# Patient Record
Sex: FEMALE | Race: WHITE
[De-identification: names, ages, dates, MRNs, and addresses within clinical notes are randomized per-mention and may not be internally consistent; named-entity substitution may affect disease eponyms.]

---

## 2019-03-07 ENCOUNTER — HOSPITAL ENCOUNTER (EMERGENCY)
Dept: HOSPITAL 55 - ED | Age: 34
Discharge: HOME | DRG: 690 | End: 2019-03-07
Payer: COMMERCIAL

## 2019-03-07 VITALS
SYSTOLIC BLOOD PRESSURE: 115 MMHG | RESPIRATION RATE: 18 BRPM | TEMPERATURE: 97.9 F | OXYGEN SATURATION: 100 % | DIASTOLIC BLOOD PRESSURE: 75 MMHG | HEART RATE: 98 BPM

## 2019-03-07 DIAGNOSIS — N39.0: Primary | ICD-10-CM

## 2019-03-07 LAB
BACTERIA #/AREA URNS HPF: (no result) /[HPF]
BILIRUB UR QL STRIP: NEGATIVE
CRYSTALS #/AREA FLD MICRO: NEGATIVE /[LPF]
EPI CELLS #/AREA URNS HPF: (no result) /[HPF]
GLUCOSE, URINE (UA): NEGATIVE
KETONES UR QL STRIP: NEGATIVE
LEUKOCYTE ESTERASE UR QL STRIP: (no result)
LEUKOCYTE ESTERASE UR QL STRIP: (no result)
NITRATE,URINE: NEGATIVE
PH UR STRIP: 7 [PH]
RBC # UR STRIP: (no result) /UL
RBC UR QL: (no result)
SP GR UR STRIP: 1.02
UROBILINOGEN,URINE: 0.2

## 2019-03-07 PROCEDURE — 87186 SC STD MICRODIL/AGAR DIL: CPT

## 2019-03-07 PROCEDURE — 81001 URINALYSIS AUTO W/SCOPE: CPT

## 2019-03-07 PROCEDURE — 99282 EMERGENCY DEPT VISIT SF MDM: CPT

## 2019-03-07 PROCEDURE — 87077 CULTURE AEROBIC IDENTIFY: CPT

## 2019-03-07 PROCEDURE — 87088 URINE BACTERIA CULTURE: CPT

## 2020-03-04 ENCOUNTER — ANESTHESIA EVENT (OUTPATIENT)
Dept: SURGERY | Facility: CLINIC | Age: 35
End: 2020-03-04
Payer: MEDICAID

## 2020-03-04 ENCOUNTER — ANESTHESIA (OUTPATIENT)
Dept: SURGERY | Facility: CLINIC | Age: 35
End: 2020-03-04
Payer: MEDICAID

## 2020-03-04 ENCOUNTER — MEDICAL CORRESPONDENCE (OUTPATIENT)
Dept: HEALTH INFORMATION MANAGEMENT | Facility: CLINIC | Age: 35
End: 2020-03-04

## 2020-03-04 ENCOUNTER — HOSPITAL ENCOUNTER (INPATIENT)
Facility: CLINIC | Age: 35
LOS: 2 days | Discharge: HOME | End: 2020-03-06
Attending: FAMILY MEDICINE | Admitting: OBSTETRICS & GYNECOLOGY
Payer: MEDICAID

## 2020-03-04 DIAGNOSIS — R10.84 ABDOMINAL PAIN, GENERALIZED: ICD-10-CM

## 2020-03-04 DIAGNOSIS — Z98.890 S/P EXPLORATORY LAPAROTOMY: Primary | ICD-10-CM

## 2020-03-04 DIAGNOSIS — S37.69XA UTERINE PERFORATION, INITIAL ENCOUNTER: ICD-10-CM

## 2020-03-04 LAB
ABO + RH BLD: NORMAL
ABO + RH BLD: NORMAL
ANION GAP SERPL CALCULATED.3IONS-SCNC: 8 MMOL/L (ref 3–14)
BASOPHILS # BLD AUTO: 0 10E9/L (ref 0–0.2)
BASOPHILS NFR BLD AUTO: 0.4 %
BLD GP AB SCN SERPL QL: NORMAL
BLD PROD TYP BPU: NORMAL
BLOOD BANK CMNT PATIENT-IMP: NORMAL
BUN SERPL-MCNC: 7 MG/DL (ref 7–30)
CALCIUM SERPL-MCNC: 8 MG/DL (ref 8.5–10.1)
CHLORIDE SERPL-SCNC: 110 MMOL/L (ref 94–109)
CO2 SERPL-SCNC: 21 MMOL/L (ref 20–32)
CREAT BLD-MCNC: 0.4 MG/DL (ref 0.52–1.04)
CREAT SERPL-MCNC: 0.44 MG/DL (ref 0.52–1.04)
DIFFERENTIAL METHOD BLD: ABNORMAL
EOSINOPHIL # BLD AUTO: 0.1 10E9/L (ref 0–0.7)
EOSINOPHIL NFR BLD AUTO: 1.3 %
ERYTHROCYTE [DISTWIDTH] IN BLOOD BY AUTOMATED COUNT: 13.3 % (ref 10–15)
GFR SERPL CREATININE-BSD FRML MDRD: >90 ML/MIN/{1.73_M2}
GFR SERPL CREATININE-BSD FRML MDRD: >90 ML/MIN/{1.73_M2}
GLUCOSE SERPL-MCNC: 83 MG/DL (ref 70–99)
HCT VFR BLD AUTO: 34.5 % (ref 35–47)
HGB BLD-MCNC: 11.9 G/DL (ref 11.7–15.7)
IMM GRANULOCYTES # BLD: 0 10E9/L (ref 0–0.4)
IMM GRANULOCYTES NFR BLD: 0.3 %
LYMPHOCYTES # BLD AUTO: 2.6 10E9/L (ref 0.8–5.3)
LYMPHOCYTES NFR BLD AUTO: 33.5 %
MCH RBC QN AUTO: 30.7 PG (ref 26.5–33)
MCHC RBC AUTO-ENTMCNC: 34.5 G/DL (ref 31.5–36.5)
MCV RBC AUTO: 89 FL (ref 78–100)
MONOCYTES # BLD AUTO: 0.4 10E9/L (ref 0–1.3)
MONOCYTES NFR BLD AUTO: 4.7 %
NEUTROPHILS # BLD AUTO: 4.7 10E9/L (ref 1.6–8.3)
NEUTROPHILS NFR BLD AUTO: 59.8 %
NRBC # BLD AUTO: 0 10*3/UL
NRBC BLD AUTO-RTO: 0 /100
NUM BPU REQUESTED: 1
PLATELET # BLD AUTO: 174 10E9/L (ref 150–450)
POTASSIUM SERPL-SCNC: 3.4 MMOL/L (ref 3.4–5.3)
RBC # BLD AUTO: 3.87 10E12/L (ref 3.8–5.2)
SODIUM SERPL-SCNC: 139 MMOL/L (ref 133–144)
SPECIMEN EXP DATE BLD: NORMAL
WBC # BLD AUTO: 7.8 10E9/L (ref 4–11)

## 2020-03-04 PROCEDURE — 88305 TISSUE EXAM BY PATHOLOGIST: CPT | Mod: 26 | Performed by: OBSTETRICS & GYNECOLOGY

## 2020-03-04 PROCEDURE — 85025 COMPLETE CBC W/AUTO DIFF WBC: CPT | Performed by: FAMILY MEDICINE

## 2020-03-04 PROCEDURE — 96361 HYDRATE IV INFUSION ADD-ON: CPT | Performed by: FAMILY MEDICINE

## 2020-03-04 PROCEDURE — 25000128 H RX IP 250 OP 636: Performed by: ANESTHESIOLOGY

## 2020-03-04 PROCEDURE — 37000009 ZZH ANESTHESIA TECHNICAL FEE, EACH ADDTL 15 MIN: Performed by: OBSTETRICS & GYNECOLOGY

## 2020-03-04 PROCEDURE — 86850 RBC ANTIBODY SCREEN: CPT | Performed by: FAMILY MEDICINE

## 2020-03-04 PROCEDURE — 25800030 ZZH RX IP 258 OP 636: Performed by: NURSE ANESTHETIST, CERTIFIED REGISTERED

## 2020-03-04 PROCEDURE — 25000125 ZZHC RX 250: Performed by: STUDENT IN AN ORGANIZED HEALTH CARE EDUCATION/TRAINING PROGRAM

## 2020-03-04 PROCEDURE — 40000170 ZZH STATISTIC PRE-PROCEDURE ASSESSMENT II: Performed by: OBSTETRICS & GYNECOLOGY

## 2020-03-04 PROCEDURE — 36000059 ZZH SURGERY LEVEL 3 EA 15 ADDTL MIN UMMC: Performed by: OBSTETRICS & GYNECOLOGY

## 2020-03-04 PROCEDURE — 12000001 ZZH R&B MED SURG/OB UMMC

## 2020-03-04 PROCEDURE — 71000014 ZZH RECOVERY PHASE 1 LEVEL 2 FIRST HR: Performed by: OBSTETRICS & GYNECOLOGY

## 2020-03-04 PROCEDURE — 25000125 ZZHC RX 250: Performed by: NURSE ANESTHETIST, CERTIFIED REGISTERED

## 2020-03-04 PROCEDURE — 88305 TISSUE EXAM BY PATHOLOGIST: CPT | Performed by: OBSTETRICS & GYNECOLOGY

## 2020-03-04 PROCEDURE — 25800030 ZZH RX IP 258 OP 636: Performed by: STUDENT IN AN ORGANIZED HEALTH CARE EDUCATION/TRAINING PROGRAM

## 2020-03-04 PROCEDURE — 10A07ZZ ABORTION OF PRODUCTS OF CONCEPTION, VIA NATURAL OR ARTIFICIAL OPENING: ICD-10-PCS | Performed by: OBSTETRICS & GYNECOLOGY

## 2020-03-04 PROCEDURE — 25000566 ZZH SEVOFLURANE, EA 15 MIN: Performed by: OBSTETRICS & GYNECOLOGY

## 2020-03-04 PROCEDURE — 25000128 H RX IP 250 OP 636: Performed by: NURSE ANESTHETIST, CERTIFIED REGISTERED

## 2020-03-04 PROCEDURE — 96374 THER/PROPH/DIAG INJ IV PUSH: CPT | Performed by: FAMILY MEDICINE

## 2020-03-04 PROCEDURE — 00000159 ZZHCL STATISTIC H-SEND OUTS PREP: Performed by: OBSTETRICS & GYNECOLOGY

## 2020-03-04 PROCEDURE — C9290 INJ, BUPIVACAINE LIPOSOME: HCPCS | Performed by: ANESTHESIOLOGY

## 2020-03-04 PROCEDURE — 37000008 ZZH ANESTHESIA TECHNICAL FEE, 1ST 30 MIN: Performed by: OBSTETRICS & GYNECOLOGY

## 2020-03-04 PROCEDURE — 36000057 ZZH SURGERY LEVEL 3 1ST 30 MIN - UMMC: Performed by: OBSTETRICS & GYNECOLOGY

## 2020-03-04 PROCEDURE — 80048 BASIC METABOLIC PNL TOTAL CA: CPT | Performed by: FAMILY MEDICINE

## 2020-03-04 PROCEDURE — 86901 BLOOD TYPING SEROLOGIC RH(D): CPT | Performed by: FAMILY MEDICINE

## 2020-03-04 PROCEDURE — 82565 ASSAY OF CREATININE: CPT

## 2020-03-04 PROCEDURE — 25800030 ZZH RX IP 258 OP 636: Performed by: FAMILY MEDICINE

## 2020-03-04 PROCEDURE — 99285 EMERGENCY DEPT VISIT HI MDM: CPT | Mod: 25 | Performed by: FAMILY MEDICINE

## 2020-03-04 PROCEDURE — 86923 COMPATIBILITY TEST ELECTRIC: CPT | Performed by: FAMILY MEDICINE

## 2020-03-04 PROCEDURE — 27210794 ZZH OR GENERAL SUPPLY STERILE: Performed by: OBSTETRICS & GYNECOLOGY

## 2020-03-04 PROCEDURE — 25000128 H RX IP 250 OP 636: Performed by: FAMILY MEDICINE

## 2020-03-04 PROCEDURE — 0UQ90ZZ REPAIR UTERUS, OPEN APPROACH: ICD-10-PCS | Performed by: OBSTETRICS & GYNECOLOGY

## 2020-03-04 PROCEDURE — 71000015 ZZH RECOVERY PHASE 1 LEVEL 2 EA ADDTL HR: Performed by: OBSTETRICS & GYNECOLOGY

## 2020-03-04 PROCEDURE — 99284 EMERGENCY DEPT VISIT MOD MDM: CPT | Mod: Z6 | Performed by: FAMILY MEDICINE

## 2020-03-04 PROCEDURE — 25000128 H RX IP 250 OP 636: Performed by: STUDENT IN AN ORGANIZED HEALTH CARE EDUCATION/TRAINING PROGRAM

## 2020-03-04 PROCEDURE — 25000132 ZZH RX MED GY IP 250 OP 250 PS 637: Performed by: OBSTETRICS & GYNECOLOGY

## 2020-03-04 PROCEDURE — 86900 BLOOD TYPING SEROLOGIC ABO: CPT | Performed by: FAMILY MEDICINE

## 2020-03-04 RX ORDER — KETOROLAC TROMETHAMINE 15 MG/ML
15 INJECTION, SOLUTION INTRAMUSCULAR; INTRAVENOUS EVERY 6 HOURS
Status: DISCONTINUED | OUTPATIENT
Start: 2020-03-04 | End: 2020-03-06

## 2020-03-04 RX ORDER — SODIUM CHLORIDE 9 MG/ML
100 INJECTION, SOLUTION INTRAVENOUS ONCE
Status: DISCONTINUED | OUTPATIENT
Start: 2020-03-04 | End: 2020-03-04

## 2020-03-04 RX ORDER — DEXAMETHASONE SODIUM PHOSPHATE 4 MG/ML
INJECTION, SOLUTION INTRA-ARTICULAR; INTRALESIONAL; INTRAMUSCULAR; INTRAVENOUS; SOFT TISSUE PRN
Status: DISCONTINUED | OUTPATIENT
Start: 2020-03-04 | End: 2020-03-04

## 2020-03-04 RX ORDER — LIDOCAINE 40 MG/G
CREAM TOPICAL
Status: DISCONTINUED | OUTPATIENT
Start: 2020-03-04 | End: 2020-03-06 | Stop reason: HOSPADM

## 2020-03-04 RX ORDER — ONDANSETRON 4 MG/1
4 TABLET, ORALLY DISINTEGRATING ORAL EVERY 30 MIN PRN
Status: DISCONTINUED | OUTPATIENT
Start: 2020-03-04 | End: 2020-03-04 | Stop reason: HOSPADM

## 2020-03-04 RX ORDER — SODIUM CHLORIDE, SODIUM LACTATE, POTASSIUM CHLORIDE, CALCIUM CHLORIDE 600; 310; 30; 20 MG/100ML; MG/100ML; MG/100ML; MG/100ML
INJECTION, SOLUTION INTRAVENOUS CONTINUOUS PRN
Status: DISCONTINUED | OUTPATIENT
Start: 2020-03-04 | End: 2020-03-04

## 2020-03-04 RX ORDER — PROPOFOL 10 MG/ML
INJECTION, EMULSION INTRAVENOUS PRN
Status: DISCONTINUED | OUTPATIENT
Start: 2020-03-04 | End: 2020-03-04

## 2020-03-04 RX ORDER — ACETAMINOPHEN 325 MG/1
650 TABLET ORAL EVERY 4 HOURS PRN
Status: DISCONTINUED | OUTPATIENT
Start: 2020-03-07 | End: 2020-03-06 | Stop reason: HOSPADM

## 2020-03-04 RX ORDER — ONDANSETRON 2 MG/ML
INJECTION INTRAMUSCULAR; INTRAVENOUS PRN
Status: DISCONTINUED | OUTPATIENT
Start: 2020-03-04 | End: 2020-03-04

## 2020-03-04 RX ORDER — BUPIVACAINE HYDROCHLORIDE 2.5 MG/ML
INJECTION, SOLUTION EPIDURAL; INFILTRATION; INTRACAUDAL PRN
Status: DISCONTINUED | OUTPATIENT
Start: 2020-03-04 | End: 2020-03-04

## 2020-03-04 RX ORDER — FENTANYL CITRATE 50 UG/ML
25-50 INJECTION, SOLUTION INTRAMUSCULAR; INTRAVENOUS
Status: DISCONTINUED | OUTPATIENT
Start: 2020-03-04 | End: 2020-03-04 | Stop reason: HOSPADM

## 2020-03-04 RX ORDER — HYDROMORPHONE HYDROCHLORIDE 1 MG/ML
.3-.5 INJECTION, SOLUTION INTRAMUSCULAR; INTRAVENOUS; SUBCUTANEOUS EVERY 5 MIN PRN
Status: DISCONTINUED | OUTPATIENT
Start: 2020-03-04 | End: 2020-03-04 | Stop reason: HOSPADM

## 2020-03-04 RX ORDER — FENTANYL CITRATE 50 UG/ML
INJECTION, SOLUTION INTRAMUSCULAR; INTRAVENOUS PRN
Status: DISCONTINUED | OUTPATIENT
Start: 2020-03-04 | End: 2020-03-04

## 2020-03-04 RX ORDER — ONDANSETRON 2 MG/ML
4 INJECTION INTRAMUSCULAR; INTRAVENOUS EVERY 6 HOURS PRN
Status: DISCONTINUED | OUTPATIENT
Start: 2020-03-04 | End: 2020-03-06 | Stop reason: HOSPADM

## 2020-03-04 RX ORDER — ACETAMINOPHEN 325 MG/1
975 TABLET ORAL EVERY 8 HOURS
Status: DISCONTINUED | OUTPATIENT
Start: 2020-03-04 | End: 2020-03-06 | Stop reason: HOSPADM

## 2020-03-04 RX ORDER — CEFAZOLIN SODIUM 2 G/100ML
2 INJECTION, SOLUTION INTRAVENOUS
Status: COMPLETED | OUTPATIENT
Start: 2020-03-04 | End: 2020-03-04

## 2020-03-04 RX ORDER — SODIUM CHLORIDE, SODIUM LACTATE, POTASSIUM CHLORIDE, CALCIUM CHLORIDE 600; 310; 30; 20 MG/100ML; MG/100ML; MG/100ML; MG/100ML
INJECTION, SOLUTION INTRAVENOUS CONTINUOUS
Status: DISCONTINUED | OUTPATIENT
Start: 2020-03-04 | End: 2020-03-04 | Stop reason: HOSPADM

## 2020-03-04 RX ORDER — DEXTROAMPHETAMINE SACCHARATE, AMPHETAMINE ASPARTATE, DEXTROAMPHETAMINE SULFATE AND AMPHETAMINE SULFATE 5; 5; 5; 5 MG/1; MG/1; MG/1; MG/1
20 TABLET ORAL 2 TIMES DAILY
COMMUNITY
Start: 2020-02-06 | End: 2020-03-07

## 2020-03-04 RX ORDER — SODIUM CHLORIDE 9 MG/ML
INJECTION, SOLUTION INTRAVENOUS
Status: DISCONTINUED
Start: 2020-03-04 | End: 2020-03-04 | Stop reason: HOSPADM

## 2020-03-04 RX ORDER — AMOXICILLIN 250 MG
1 CAPSULE ORAL 2 TIMES DAILY
Status: DISCONTINUED | OUTPATIENT
Start: 2020-03-04 | End: 2020-03-06 | Stop reason: HOSPADM

## 2020-03-04 RX ORDER — OXYCODONE HYDROCHLORIDE 5 MG/1
5-10 TABLET ORAL
Status: DISCONTINUED | OUTPATIENT
Start: 2020-03-04 | End: 2020-03-06

## 2020-03-04 RX ORDER — MISOPROSTOL 200 UG/1
TABLET ORAL PRN
Status: DISCONTINUED | OUTPATIENT
Start: 2020-03-04 | End: 2020-03-04 | Stop reason: HOSPADM

## 2020-03-04 RX ORDER — DIPHENHYDRAMINE HYDROCHLORIDE 50 MG/ML
25 INJECTION INTRAMUSCULAR; INTRAVENOUS EVERY 6 HOURS PRN
Status: DISCONTINUED | OUTPATIENT
Start: 2020-03-04 | End: 2020-03-06 | Stop reason: HOSPADM

## 2020-03-04 RX ORDER — SODIUM CHLORIDE 9 MG/ML
INJECTION, SOLUTION INTRAVENOUS ONCE
Status: COMPLETED | OUTPATIENT
Start: 2020-03-04 | End: 2020-03-04

## 2020-03-04 RX ORDER — NALOXONE HYDROCHLORIDE 0.4 MG/ML
.1-.4 INJECTION, SOLUTION INTRAMUSCULAR; INTRAVENOUS; SUBCUTANEOUS
Status: DISCONTINUED | OUTPATIENT
Start: 2020-03-04 | End: 2020-03-06 | Stop reason: HOSPADM

## 2020-03-04 RX ORDER — ONDANSETRON 4 MG/1
4 TABLET, ORALLY DISINTEGRATING ORAL EVERY 6 HOURS PRN
Status: DISCONTINUED | OUTPATIENT
Start: 2020-03-04 | End: 2020-03-06 | Stop reason: HOSPADM

## 2020-03-04 RX ORDER — OXYCODONE HYDROCHLORIDE 5 MG/1
5 TABLET ORAL EVERY 4 HOURS PRN
Status: DISCONTINUED | OUTPATIENT
Start: 2020-03-04 | End: 2020-03-04

## 2020-03-04 RX ORDER — METHYLERGONOVINE MALEATE 0.2 MG/ML
INJECTION INTRAVENOUS PRN
Status: DISCONTINUED | OUTPATIENT
Start: 2020-03-04 | End: 2020-03-04

## 2020-03-04 RX ORDER — LORAZEPAM 2 MG/ML
1 INJECTION INTRAMUSCULAR ONCE
Status: COMPLETED | OUTPATIENT
Start: 2020-03-04 | End: 2020-03-04

## 2020-03-04 RX ORDER — NALOXONE HYDROCHLORIDE 0.4 MG/ML
.1-.4 INJECTION, SOLUTION INTRAMUSCULAR; INTRAVENOUS; SUBCUTANEOUS
Status: DISCONTINUED | OUTPATIENT
Start: 2020-03-04 | End: 2020-03-04

## 2020-03-04 RX ORDER — LORAZEPAM 0.5 MG/1
0.5 TABLET ORAL EVERY 8 HOURS PRN
COMMUNITY
Start: 2018-06-14

## 2020-03-04 RX ORDER — ONDANSETRON 2 MG/ML
4 INJECTION INTRAMUSCULAR; INTRAVENOUS EVERY 30 MIN PRN
Status: DISCONTINUED | OUTPATIENT
Start: 2020-03-04 | End: 2020-03-04 | Stop reason: HOSPADM

## 2020-03-04 RX ORDER — IOPAMIDOL 755 MG/ML
100 INJECTION, SOLUTION INTRAVASCULAR ONCE
Status: DISCONTINUED | OUTPATIENT
Start: 2020-03-04 | End: 2020-03-04

## 2020-03-04 RX ORDER — AMOXICILLIN 250 MG
2 CAPSULE ORAL 2 TIMES DAILY
Status: DISCONTINUED | OUTPATIENT
Start: 2020-03-04 | End: 2020-03-06 | Stop reason: HOSPADM

## 2020-03-04 RX ORDER — CALCIUM CARBONATE 500 MG/1
1000 TABLET, CHEWABLE ORAL 4 TIMES DAILY PRN
Status: DISCONTINUED | OUTPATIENT
Start: 2020-03-04 | End: 2020-03-06 | Stop reason: HOSPADM

## 2020-03-04 RX ORDER — DIPHENHYDRAMINE HCL 25 MG
25 CAPSULE ORAL EVERY 6 HOURS PRN
Status: DISCONTINUED | OUTPATIENT
Start: 2020-03-04 | End: 2020-03-06 | Stop reason: HOSPADM

## 2020-03-04 RX ORDER — CEFAZOLIN SODIUM 1 G/3ML
1 INJECTION, POWDER, FOR SOLUTION INTRAMUSCULAR; INTRAVENOUS SEE ADMIN INSTRUCTIONS
Status: DISCONTINUED | OUTPATIENT
Start: 2020-03-04 | End: 2020-03-04 | Stop reason: HOSPADM

## 2020-03-04 RX ADMIN — TRANEXAMIC ACID 1 G: 1 INJECTION, SOLUTION INTRAVENOUS at 21:06

## 2020-03-04 RX ADMIN — SUGAMMADEX 200 MG: 100 INJECTION, SOLUTION INTRAVENOUS at 21:08

## 2020-03-04 RX ADMIN — Medication 2 G: at 19:46

## 2020-03-04 RX ADMIN — PHENYLEPHRINE HYDROCHLORIDE 100 MCG: 10 INJECTION INTRAVENOUS at 19:54

## 2020-03-04 RX ADMIN — PROPOFOL 200 MG: 10 INJECTION, EMULSION INTRAVENOUS at 19:40

## 2020-03-04 RX ADMIN — BUPIVACAINE HYDROCHLORIDE 20 ML: 2.5 INJECTION, SOLUTION EPIDURAL; INFILTRATION; INTRACAUDAL at 21:30

## 2020-03-04 RX ADMIN — PHENYLEPHRINE HYDROCHLORIDE 100 MCG: 10 INJECTION INTRAVENOUS at 20:39

## 2020-03-04 RX ADMIN — DEXAMETHASONE SODIUM PHOSPHATE 4 MG: 4 INJECTION, SOLUTION INTRAMUSCULAR; INTRAVENOUS at 19:57

## 2020-03-04 RX ADMIN — DOXYCYCLINE 200 MG: 100 INJECTION, POWDER, LYOPHILIZED, FOR SOLUTION INTRAVENOUS at 19:09

## 2020-03-04 RX ADMIN — ROCURONIUM BROMIDE 20 MG: 10 INJECTION INTRAVENOUS at 20:39

## 2020-03-04 RX ADMIN — SODIUM CHLORIDE: 9 INJECTION, SOLUTION INTRAVENOUS at 17:28

## 2020-03-04 RX ADMIN — ROCURONIUM BROMIDE 30 MG: 10 INJECTION INTRAVENOUS at 19:48

## 2020-03-04 RX ADMIN — LORAZEPAM 1 MG: 2 INJECTION INTRAMUSCULAR; INTRAVENOUS at 17:31

## 2020-03-04 RX ADMIN — SODIUM CHLORIDE, POTASSIUM CHLORIDE, SODIUM LACTATE AND CALCIUM CHLORIDE: 600; 310; 30; 20 INJECTION, SOLUTION INTRAVENOUS at 19:34

## 2020-03-04 RX ADMIN — ONDANSETRON 4 MG: 2 INJECTION INTRAMUSCULAR; INTRAVENOUS at 20:48

## 2020-03-04 RX ADMIN — Medication 100 MG: at 19:40

## 2020-03-04 RX ADMIN — METHYLERGONOVINE MALEATE 200 MCG: 0.2 INJECTION INTRAMUSCULAR; INTRAVENOUS at 20:37

## 2020-03-04 RX ADMIN — FENTANYL CITRATE 50 MCG: 50 INJECTION, SOLUTION INTRAMUSCULAR; INTRAVENOUS at 19:48

## 2020-03-04 RX ADMIN — BUPIVACAINE 20 ML: 13.3 INJECTION, SUSPENSION, LIPOSOMAL INFILTRATION at 21:30

## 2020-03-04 RX ADMIN — HYDROMORPHONE HYDROCHLORIDE 0.5 MG: 1 INJECTION, SOLUTION INTRAMUSCULAR; INTRAVENOUS; SUBCUTANEOUS at 20:35

## 2020-03-04 RX ADMIN — FENTANYL CITRATE 50 MCG: 50 INJECTION, SOLUTION INTRAMUSCULAR; INTRAVENOUS at 20:15

## 2020-03-04 RX ADMIN — SODIUM CHLORIDE, POTASSIUM CHLORIDE, SODIUM LACTATE AND CALCIUM CHLORIDE: 600; 310; 30; 20 INJECTION, SOLUTION INTRAVENOUS at 20:30

## 2020-03-04 ASSESSMENT — ENCOUNTER SYMPTOMS
NAUSEA: 0
ABDOMINAL PAIN: 1
NERVOUS/ANXIOUS: 1
PALPITATIONS: 0
FEVER: 0
CHILLS: 0
SHORTNESS OF BREATH: 0
COUGH: 0
LIGHT-HEADEDNESS: 0
VOMITING: 0

## 2020-03-04 NOTE — ED PROVIDER NOTES
History     Chief Complaint   Patient presents with     Abdominal Pain     BIBA from [facility], possible failed , c/o abd pain. Worried about perforated uterus. Possible 1st trimester     HPI  Maria L Suazo is a 34 year old female who presents via 911. She had a 12 week VIP at  today.  physician transferred her here due to possibility of perforated uterus.   She has abd pain with stable vitals.     Her pmh is positive for adhd and anxiety. Takes ativan as needed        I have reviewed the Medications, Allergies, Past Medical and Surgical History, and Social History in the Epic system.    Review of Systems   Constitutional: Negative for chills and fever.   HENT: Negative for congestion.    Respiratory: Negative for cough and shortness of breath.    Cardiovascular: Negative for chest pain and palpitations.   Gastrointestinal: Positive for abdominal pain. Negative for nausea and vomiting.   Genitourinary:        See hpi   Skin: Negative.    Allergic/Immunologic: Negative for immunocompromised state.   Neurological: Negative for light-headedness.   Psychiatric/Behavioral: The patient is nervous/anxious.        Physical Exam   BP: 108/77  Pulse: 70  Heart Rate: 58  Temp: 97.5  F (36.4  C)  Resp: 9  SpO2: 100 %      Physical Exam  Vitals signs and nursing note reviewed.   Constitutional:       General: She is not in acute distress.     Appearance: She is well-developed. She is not ill-appearing, toxic-appearing or diaphoretic.   HENT:      Head: Normocephalic and atraumatic.   Cardiovascular:      Rate and Rhythm: Normal rate.      Heart sounds: Normal heart sounds.   Pulmonary:      Effort: Pulmonary effort is normal.      Breath sounds: Normal breath sounds.   Abdominal:      Comments: The abdomen shows mild to moderate superpubic tenderness  No rebound or guarding at this time  Bleeding is minimal   Skin:     General: Skin is warm and dry.   Neurological:      General: No focal deficit  present.      Mental Status: She is alert and oriented to person, place, and time.         ED Course        Procedures        Possible perforated uterus from VIP  To the or.- no ct  GYN here seeing pt  Hemodynamically stable    She got 1 mg of ativan IV with nice control of anxiety and discomfort    Labs Ordered and Resulted from Time of ED Arrival Up to the Time of Departure from the ED   BASIC METABOLIC PANEL - Abnormal; Notable for the following components:       Result Value    Chloride 110 (*)     Creatinine 0.44 (*)     Calcium 8.0 (*)     All other components within normal limits   CBC WITH PLATELETS DIFFERENTIAL - Abnormal; Notable for the following components:    Hematocrit 34.5 (*)     All other components within normal limits   CREATININE POCT - Abnormal; Notable for the following components:    Creatinine 0.4 (*)     All other components within normal limits   ISTAT CREATININE NURSING POCT   IP ASSIGN PROVIDER TEAM TO TREATMENT TEAM   RED BLOOD CELL PREPARE ORDER UNIT   ABO/RH TYPE AND SCREEN   BLOOD COMPONENT            Assessments & Plan (with Medical Decision Making)       I have reviewed the nursing notes.    I have reviewed the findings, diagnosis, plan and need for follow up with the patient.    New Prescriptions    No medications on file       Final diagnoses:   Abdominal pain, generalized       3/4/2020   [facility], [place], EMERGENCY DEPARTMENT     Jose Angel Soliz MD  03/04/20 [id number]

## 2020-03-05 LAB
ERYTHROCYTE [DISTWIDTH] IN BLOOD BY AUTOMATED COUNT: 13.3 % (ref 10–15)
GLUCOSE BLDC GLUCOMTR-MCNC: 109 MG/DL (ref 70–99)
HCT VFR BLD AUTO: 33.4 % (ref 35–47)
HGB BLD-MCNC: 11.5 G/DL (ref 11.7–15.7)
MCH RBC QN AUTO: 31.1 PG (ref 26.5–33)
MCHC RBC AUTO-ENTMCNC: 34.4 G/DL (ref 31.5–36.5)
MCV RBC AUTO: 90 FL (ref 78–100)
PLATELET # BLD AUTO: 164 10E9/L (ref 150–450)
RBC # BLD AUTO: 3.7 10E12/L (ref 3.8–5.2)
WBC # BLD AUTO: 10.3 10E9/L (ref 4–11)

## 2020-03-05 PROCEDURE — 25000128 H RX IP 250 OP 636: Performed by: STUDENT IN AN ORGANIZED HEALTH CARE EDUCATION/TRAINING PROGRAM

## 2020-03-05 PROCEDURE — 25000132 ZZH RX MED GY IP 250 OP 250 PS 637: Performed by: STUDENT IN AN ORGANIZED HEALTH CARE EDUCATION/TRAINING PROGRAM

## 2020-03-05 PROCEDURE — 36415 COLL VENOUS BLD VENIPUNCTURE: CPT | Performed by: STUDENT IN AN ORGANIZED HEALTH CARE EDUCATION/TRAINING PROGRAM

## 2020-03-05 PROCEDURE — 85027 COMPLETE CBC AUTOMATED: CPT | Performed by: STUDENT IN AN ORGANIZED HEALTH CARE EDUCATION/TRAINING PROGRAM

## 2020-03-05 PROCEDURE — 12000001 ZZH R&B MED SURG/OB UMMC

## 2020-03-05 PROCEDURE — 00000146 ZZHCL STATISTIC GLUCOSE BY METER IP

## 2020-03-05 RX ORDER — LIDOCAINE 4 G/G
2 PATCH TOPICAL
Status: DISCONTINUED | OUTPATIENT
Start: 2020-03-05 | End: 2020-03-06

## 2020-03-05 RX ORDER — HYDROMORPHONE HCL/0.9% NACL/PF 0.2MG/0.2
0.2 SYRINGE (ML) INTRAVENOUS EVERY 4 HOURS PRN
Status: DISCONTINUED | OUTPATIENT
Start: 2020-03-05 | End: 2020-03-06

## 2020-03-05 RX ORDER — HYDROXYZINE HYDROCHLORIDE 25 MG/1
100 TABLET, FILM COATED ORAL ONCE
Status: COMPLETED | OUTPATIENT
Start: 2020-03-05 | End: 2020-03-05

## 2020-03-05 RX ADMIN — ACETAMINOPHEN 975 MG: 325 TABLET, FILM COATED ORAL at 01:26

## 2020-03-05 RX ADMIN — SENNOSIDES AND DOCUSATE SODIUM 2 TABLET: 8.6; 5 TABLET ORAL at 08:05

## 2020-03-05 RX ADMIN — OXYCODONE HYDROCHLORIDE 10 MG: 5 TABLET ORAL at 19:29

## 2020-03-05 RX ADMIN — KETOROLAC TROMETHAMINE 15 MG: 15 INJECTION, SOLUTION INTRAMUSCULAR; INTRAVENOUS at 11:10

## 2020-03-05 RX ADMIN — HYDROXYZINE HYDROCHLORIDE 100 MG: 25 TABLET ORAL at 11:11

## 2020-03-05 RX ADMIN — KETOROLAC TROMETHAMINE 15 MG: 15 INJECTION, SOLUTION INTRAMUSCULAR; INTRAVENOUS at 17:26

## 2020-03-05 RX ADMIN — OXYCODONE HYDROCHLORIDE 10 MG: 5 TABLET ORAL at 22:58

## 2020-03-05 RX ADMIN — KETOROLAC TROMETHAMINE 15 MG: 15 INJECTION, SOLUTION INTRAMUSCULAR; INTRAVENOUS at 01:30

## 2020-03-05 RX ADMIN — OXYCODONE HYDROCHLORIDE 10 MG: 5 TABLET ORAL at 12:36

## 2020-03-05 RX ADMIN — OXYCODONE HYDROCHLORIDE 10 MG: 5 TABLET ORAL at 08:06

## 2020-03-05 RX ADMIN — KETOROLAC TROMETHAMINE 15 MG: 15 INJECTION, SOLUTION INTRAMUSCULAR; INTRAVENOUS at 07:10

## 2020-03-05 RX ADMIN — ACETAMINOPHEN 975 MG: 325 TABLET, FILM COATED ORAL at 08:04

## 2020-03-05 RX ADMIN — ACETAMINOPHEN 975 MG: 325 TABLET, FILM COATED ORAL at 14:44

## 2020-03-05 RX ADMIN — SENNOSIDES AND DOCUSATE SODIUM 1 TABLET: 8.6; 5 TABLET ORAL at 01:26

## 2020-03-05 RX ADMIN — OXYCODONE HYDROCHLORIDE 10 MG: 5 TABLET ORAL at 16:06

## 2020-03-05 RX ADMIN — KETOROLAC TROMETHAMINE 15 MG: 15 INJECTION, SOLUTION INTRAMUSCULAR; INTRAVENOUS at 22:58

## 2020-03-05 RX ADMIN — OXYCODONE HYDROCHLORIDE 10 MG: 5 TABLET ORAL at 06:36

## 2020-03-05 RX ADMIN — OXYCODONE HYDROCHLORIDE 5 MG: 5 TABLET ORAL at 02:37

## 2020-03-05 RX ADMIN — ACETAMINOPHEN 975 MG: 325 TABLET, FILM COATED ORAL at 22:57

## 2020-03-05 RX ADMIN — Medication 0.2 MG: at 11:50

## 2020-03-05 RX ADMIN — LIDOCAINE 2 PATCH: 560 PATCH PERCUTANEOUS; TOPICAL; TRANSDERMAL at 08:08

## 2020-03-05 RX ADMIN — SENNOSIDES AND DOCUSATE SODIUM 2 TABLET: 8.6; 5 TABLET ORAL at 19:29

## 2020-03-05 ASSESSMENT — ACTIVITIES OF DAILY LIVING (ADL)
ADLS_ACUITY_SCORE: 12
ADLS_ACUITY_SCORE: 13
ADLS_ACUITY_SCORE: 12
ADLS_ACUITY_SCORE: 13
ADLS_ACUITY_SCORE: 14
ADLS_ACUITY_SCORE: 14

## 2020-03-05 NOTE — PLAN OF CARE
VS: 99.1 F (oral)  BP 99/50   Pulse 80   Resp 12   Wt 75.8 kg (167 lb)    O2: 97% room air   Output: Voiding adequately and spontaneously. Some blood with urine.   Last BM: No BM, passing flatus.   Activity: Standby assist, walker   Skin: Intact ex. surgical incision   Pain: Inadequate pain relief with IV toradol, oral tylenol and oral oxycodone. Added IV dilaudid for afternoon.   CMS: Intact, A&O x4   Dressing: Clean, dry and intact   Diet: Regular adult   LDA: Right PIV saline locked, left PIV removed   Plan: Continue to monitor vitals. Follow pain management regimen to control abdominal pain.   Additional Info:

## 2020-03-05 NOTE — BRIEF OP NOTE
[facility], [place]    Brief Operative Note    Pre-operative diagnosis:   - Uterine perforation    Post-operative diagnosis   - Same as above     Procedure: Procedure(s):  Exploratory Laparotomy  Exploratory Laparotomy, Suction Dilation and Curettage  Dilation and curettage suction  Surgeon: Surgeon(s) and Role:  Panel 1:     * Milly Wilson MD - Primary     * Anabell Stafford MD - Resident - Assisting  Panel 2:     * Milly Wilson MD - Primary  Anesthesia: General   Estimated blood loss: 300 ml  IVF: 1300 ml   UOP: 300 ml clear urine   Drains: None  Specimens:   ID Type Source Tests Collected by Time Destination   A : Age of Gestation 12 weeks Products of Conception Uterus SURGICAL PATHOLOGY EXAM Milly Wilson MD 3/4/2020  8:31 PM      Findings:     On laparotomy approximately 50 ml of hemoperitoneum. Entire bowel examined in sequential fashion, no signs of bowel injury. Normal appearing omentum with no signs of injury. Uterus enlarged and boggy, size consistent with 12 weeks. Uterine perforation in two places, one perforation 5 mm in size midline anterior fundus and other sight of perforation in middle of anterior uterus approximately 1 cm in size. Both areas oozing. Bladder normal appearing with no signs of injury. Following repair of perforations, surgical sights hemostatic. Suction D&C performed with tissue visualized in suction canula. At end of case gritty endometrium. Products of conception examined, calvarium, spine, both upper extremities present. Lower extremities removed with prior procedure. Uterus firm at end of case with appropriate amount of bleeding.     Complications: None    Anabell Stafford MD   OB/GYN Resident PGY-3  3/4/2020 9:15 PM

## 2020-03-05 NOTE — PLAN OF CARE
VS:   BP 99/50   Pulse 80   Temp 97  F (36.1  C) (Oral)   Resp 12   Wt 75.8 kg (167 lb)   SpO2 97%   Breastfeeding No   Denies SOB or chest pain   Output:   Pt was able to urinate this am, reddish in color. 1 full pad this am. No BM.    Activity:   SBA   Skin: Intact ex incision on abdomen   Pain:   Upper and lower abdominal pain, Managed with IV Toradal and oral Tylenol and Oxycodone   Neuro/CMS:   A&O x4, cms intact   Dressing(s):   CDI, minimal shadowing    Diet:   Regular, good appetite   LDA:   PIV L and R, SL   Equipment:   Capnography, walker, IV pole   Plan:   Encourage ambulation, continue plan of care, pt able to make needs known, call light within reach    Additional Info:   Pt slept throughout the night Possible discharge home

## 2020-03-05 NOTE — ANESTHESIA CARE TRANSFER NOTE
Patient: Maria L Suazo    Procedure(s):  Exploratory Laparotomy, Evacuation of Uterine Contents and Repair of Uterus  Suction Dilation and Curettage  Dilation and curettage suction    Diagnosis: Uterine perforation [S37.69XA]  Diagnosis Additional Information: No value filed.    Anesthesia Type:   General, For Post-op pain in coordination with surgeon, Peripheral Nerve Block     Note:  Airway :Face Mask  Patient transferred to:PACU  Handoff Report: Identifed the Patient, Identified the Reponsible Provider, Reviewed the pertinent medical history, Discussed the surgical course, Reviewed Intra-OP anesthesia mangement and issues during anesthesia, Set expectations for post-procedure period and Allowed opportunity for questions and acknowledgement of understanding      Vitals: (Last set prior to Anesthesia Care Transfer)    CRNA VITALS  3/4/2020 2103 - 3/4/2020 2139      3/4/2020             Pulse:  76    SpO2:  99 %    Resp Rate (observed):  (!) 2                Electronically Signed By: JOSÉ MIGUEL Coker CRNA  March 4, 2020  9:39 PM

## 2020-03-05 NOTE — ANESTHESIA PROCEDURE NOTES
Peripheral Nerve Block Procedure Note    Staff:     Anesthesiologist:  Nila Herzog MD    Resident/CRNA:  Kishor Duran MD    Block performed by resident/CRNA in the presence of a teaching physician    Location: OR AFTER induction  Procedure Start/Stop TImes:      3/4/2020 9:25 PM     3/4/2020 9:30 PM    patient identified, IV checked, site marked, risks and benefits discussed, informed consent, monitors and equipment checked, pre-op evaluation, at physician/surgeon's request and post-op pain management      Correct Patient: Yes      Correct Position: Yes      Correct Site: Yes      Correct Procedure: Yes      Correct Laterality:  Yes    Site Marked:  Yes  Procedure details:     Procedure:  TAP    ASA:  2 and Emergent    Diagnosis:  Post laparotomy    Laterality:  Bilateral    Position:  Supine    Sterile Prep: chloraprep, mask and sterile gloves      Local skin infiltration:  None    Needle:  Short bevel    Needle gauge:  21    Needle length (inches):  4    Ultrasound: Yes      Ultrasound used to identify targeted nerve, plexus, or vascular structure and placed a needle adjacent to it      Permanent Image entered into patiient's record      Abnormal pain on injection: No      Blood Aspirated: No      Paresthesias:  No    Bleeding at site: No      Bolus via:  Needle    Infusion Method:  Single Shot    Complications:  None  Assessment/Narrative:     Injection made incrementally with aspirations every (mL):  5

## 2020-03-05 NOTE — PROGRESS NOTES
Gynecology Progress Note    S: Patient still reporting severe pain. Denies past opioid or chronic opioid use. Had breakfast, no nausea or vomiting. Bleeding has been minimal. Does not think she is passing gas. Has voided x 1, reported some burning.     O:   Vitals:    03/05/20 0030 03/05/20 0136 03/05/20 0230 03/05/20 0700   BP: 98/57 100/57 102/53 99/50   Pulse: 71 74 82 80   Resp:  10 12 12   Temp:       TempSrc:       SpO2:  98%  97%   Weight:         Gen: NAD  Resp: nonlabored on room air  Abdomen: tender throughout abdomen in all quadrants, but no rebound    A&P: Maria L Suazo is a 34 year old female POD#1 s/p ex lap, repair of uterine rupture X2. Poor pain control in postop period.     S/p adding lidocaine patches to pain regimen this am. Vitals and postop hgb today are reassuring. Encouraged PO hydration. Will try atarax for sleep and anxiety, and 0.2 mg dilaudid q4 for 3 doses and see if this improves pain. Awaiting return of bowel function. Will check in w/ patient again this afternoon. Spoke with Dr Wilson who is in agreement with plan. Will straight cath for urine sample if dysuria persists with future voids.    Malorie Henley MD  Obstetrics and Gynecology PGY-1  11:44 AM 3/5/2020    I have seen and examined the patient without the resident. I have reviewed, edited, and agree with the note.   My findings are: appears well  Abdomen: soft NT ND  Incision CDI  Hemoglobin   Date Value Ref Range Status   03/05/2020 11.5 (L) 11.7 - 15.7 g/dL Final   03/04/2020 11.9 11.7 - 15.7 g/dL Final     Requests SW consult to address concerns regarding lost work due to this surgical complication.   Milly Wilson MD

## 2020-03-05 NOTE — OR NURSING
PACU to Inpatient Nursing Handoff    Patient Maria L Suazo is a 34 year old female who speaks English.   Procedure Procedure(s):  Exploratory Laparotomy, Evacuation of Uterine Contents and Repair of Uterus  Suction Dilation and Curettage  Dilation and curettage suction   Surgeon(s) Primary: Milly Wilson MD  Resident - Assisting: Anabell Stafford MD     Allergies   Allergen Reactions     Morphine Hives     PN: hives         Isolation  [unfilled]     Past Medical History   has no past medical history on file.    Anesthesia General   Dermatome Level     Preop Meds Ativan pre-op - time given: 2000   Nerve block Abdominal tap block .  Location:bilateral. Med:bupivacaine. Time given: 2130   Intraop Meds dexamethasone (Decadron)  fentanyl (Sublimaze): 100 mcg total  hydromorphone (Dilaudid): .5 mg total  ondansetron (Zofran): last given at 2000   Local Meds Yes   Antibiotics cefazolin (Ancef) - last given at 1945     Pain Patient Currently in Pain: denies  Comfort: negligible pain  Pain Control: partially effective   PACU meds  Not applicable   PCA / epidural No   Capnography     Telemetry ECG Rhythm: Normal sinus rhythm   Inpatient Telemetry Monitor Ordered? Yes        Labs Glucose Lab Results   Component Value Date    GLC 83 03/04/2020       Hgb Lab Results   Component Value Date    HGB 11.9 03/04/2020       INR No results found for: INR   PACU Imaging Not applicable     Wound/Incision Incision/Surgical Site 03/04/20 Abdomen (Active)   Incision Assessment UTV 3/4/2020 10:06 PM   Catherine-Incision Assessment UTV 3/4/2020 10:06 PM   Closure Sutures 3/4/2020  9:10 PM   Incision Drainage Amount None 3/4/2020 10:06 PM   Dressing Intervention Clean, dry, intact 3/4/2020 10:06 PM   Number of days: 0      CMS        Equipment Not applicable   Other LDA       IV Access Peripheral IV 03/04/20 Left (Active)   Site Assessment WDL 3/4/2020 10:00 PM   Line Status Infusing 3/4/2020 10:00 PM   Phlebitis Scale 0-->no symptoms  3/4/2020 10:00 PM   Number of days: 0       Peripheral IV 03/04/20 Right Upper forearm (Active)   Site Assessment WDL 3/4/2020 10:00 PM   Line Status Saline locked 3/4/2020 10:00 PM   Number of days: 0      Blood Products Not applicable    mL   Intake/Output Date 03/04/20 0700 - 03/05/20 0659   Shift 3011-6204 8322-1564 3440-7041 24 Hour Total   INTAKE   I.V.  1500  1500   Shift Total(mL/kg)  1500(19.8)  1500(19.8)   OUTPUT   Urine  300  300   Blood  300  300   Shift Total(mL/kg)  600(7.92)  600(7.92)   Weight (kg)  75.75 75.75 75.75      Drains / Cardona     Time of void PreOp Void Prior to Procedure: 1600 (03/04/20 1906)    PostOp      Diapered? No   Bladder Scan     PO    ice chips     Vitals    B/P: 99/54  T: 98.4  F (36.9  C)    Temp src: Axillary  P:  Pulse: 75 (03/04/20 2136)    Heart Rate: 71 (03/04/20 2145)     R: 10  O2:  SpO2: 100 %    O2 Device: None (Room air) (03/04/20 2158)    Oxygen Delivery: 8 LPM (03/04/20 2145)         Family/support present friend Sandy Schaeffer belongings     Patient transported on bed   DC meds/scripts (obs/outpt) Not applicable   Inpatient Pain Meds Released? Yes       Special needs/considerations None   Tasks needing completion None       Maura Velasquez RN  ASCOM [id number]

## 2020-03-05 NOTE — H&P
[facility] History and Physical    Maria L Suazo MRN# [id number]   Age: 34 year old YOB: 1985     Date of Admission:  3/4/2020          Assessment and Plan:   Assessment:   Uterine perforation during legal termination of pregnancy:  Currently hemodynamically stable and without significant pain.  Due to possibility of bowel injury or injury to other structures, exploratory laparotomy is warranted.      Plan:     Exploratory laparotomy, suction dilation and curettage, possible abdominal hysterectomy, possible bowel resection by general surgery if indicated.    NPO, IV fluids    Intraop Ancef and doxycycline, need for further antibiotics TBD    Admit after surgery     Patient seen and examined with Dr. Milly Wilson.     Eliana Brito MD  PGY4 OBGYN    I have seen and examined the patient with the resident. I have reviewed, edited, and agree with the note.   My findings are: appears well but tired  Abdomen soft nontender, nondistended, no rebound or guarding.    Milly Wilson MD         Chief Complaint:   Uterine perfortation     History is obtained from the patient          History of Present Illness:   This patient is a 34 year old  female @ 12wga who presents with uterine perforation sustained during suction dilation and curettage.  She reports feeling minimal pain.  She states the pain was worse during the procedure.  Reports having uterine pain prior to procedure.  She denies lightheadedness or dizziness but she has not ambulated yet.  She denies nausea or vomiting.  She denies chest pain or shortness of breath.  Uterine perforation recommendations for exploratory laparotomy as well as rationale behind the procedure.          Obstetrical History:   Status post 3 normal spontaneous vaginal deliveries          Past Medical History:   ADHD and anxiety          Past Surgical History:   Cholecystectomy, complicated by postoperative intra-abdominal infection requiring drain  placement  Appendectomy          Allergies:     Allergies   Allergen Reactions     Morphine Hives     PN: hives               Medications:   Adderall and Ativan          Review of Systems:   A comprehensive review of systems was performed and found to be negative except as described in this note          Physical Exam:     Vitals were reviewed  Patient Vitals for the past 8 hrs:   BP Temp Temp src Pulse Heart Rate Resp SpO2 Weight   03/04/20 1840 -- -- -- -- -- 18 -- --   03/04/20 1835 -- -- -- -- 68 10 -- --   03/04/20 1830 -- -- -- -- 67 10 99 % --   03/04/20 1825 -- -- -- -- 62 10 100 % --   03/04/20 1820 -- -- -- -- 69 10 100 % --   03/04/20 1815 -- -- -- -- 63 11 100 % --   03/04/20 1810 -- -- -- -- 63 9 100 % --   03/04/20 1805 -- -- -- -- 61 10 100 % --   03/04/20 1800 109/76 -- -- -- 72 10 100 % --   03/04/20 1755 109/76 -- -- 73 71 13 100 % --   03/04/20 1750 109/76 -- -- -- 82 14 100 % --   03/04/20 1745 113/64 -- -- -- 72 8 100 % --   03/04/20 1740 -- -- -- 62 71 8 99 % --   03/04/20 1735 -- -- -- -- 71 14 100 % --   03/04/20 1732 108/74 -- -- -- -- -- -- --   03/04/20 1730 108/77 97.5  F (36.4  C) Oral 70 58 9 100 % --   03/04/20 1725 -- -- -- -- 85 12 100 % --   03/04/20 1720 -- -- -- -- 68 25 100 % --     Constitutional:   awake, alert, cooperative, no apparent distress, and appears stated age     Lungs:   no increased work of breathing     Cardiovascular:   no edema     Abdomen:   Soft, nondistended, minimally tender over uterus             Data:     Results for orders placed or performed during the hospital encounter of 03/04/20 (from the past 24 hour(s))   Basic metabolic panel   Result Value Ref Range    Sodium 139 133 - 144 mmol/L    Potassium 3.4 3.4 - 5.3 mmol/L    Chloride 110 (H) 94 - 109 mmol/L    Carbon Dioxide 21 20 - 32 mmol/L    Anion Gap 8 3 - 14 mmol/L    Glucose 83 70 - 99 mg/dL    Urea Nitrogen 7 7 - 30 mg/dL    Creatinine 0.44 (L) 0.52 - 1.04 mg/dL    GFR Estimate >90 >60  mL/min/[1.73_m2]    GFR Estimate If Black >90 >60 mL/min/[1.73_m2]    Calcium 8.0 (L) 8.5 - 10.1 mg/dL   CBC with platelets differential   Result Value Ref Range    WBC 7.8 4.0 - 11.0 10e9/L    RBC Count 3.87 3.8 - 5.2 10e12/L    Hemoglobin 11.9 11.7 - 15.7 g/dL    Hematocrit 34.5 (L) 35.0 - 47.0 %    MCV 89 78 - 100 fl    MCH 30.7 26.5 - 33.0 pg    MCHC 34.5 31.5 - 36.5 g/dL    RDW 13.3 10.0 - 15.0 %    Platelet Count 174 150 - 450 10e9/L    Diff Method Automated Method     % Neutrophils 59.8 %    % Lymphocytes 33.5 %    % Monocytes 4.7 %    % Eosinophils 1.3 %    % Basophils 0.4 %    % Immature Granulocytes 0.3 %    Nucleated RBCs 0 0 /100    Absolute Neutrophil 4.7 1.6 - 8.3 10e9/L    Absolute Lymphocytes 2.6 0.8 - 5.3 10e9/L    Absolute Monocytes 0.4 0.0 - 1.3 10e9/L    Absolute Eosinophils 0.1 0.0 - 0.7 10e9/L    Absolute Basophils 0.0 0.0 - 0.2 10e9/L    Abs Immature Granulocytes 0.0 0 - 0.4 10e9/L    Absolute Nucleated RBC 0.0    ABO/Rh type and screen   Result Value Ref Range    Units Ordered 1     ABO B     RH(D) Pos     Antibody Screen Neg     Test Valid Only At          [facility]    Specimen Expires 03/07/2020     Crossmatch Red Blood Cells    Blood component   Result Value Ref Range    Unit Number [id number]     Blood Component Type Red Blood Cells Leukocyte Reduced     Division Number 00     Status of Unit Ready for patient 03/04/2020 1819     Blood Product Code [id number]     Unit Status LYNDA    Creatinine POCT   Result Value Ref Range    Creatinine 0.4 (L) 0.52 - 1.04 mg/dL    GFR Estimate >90 >60 mL/min/[1.73_m2]    GFR Estimate If Black >90 >60 mL/min/[1.73_m2]

## 2020-03-05 NOTE — PROGRESS NOTES
"Gynecology Progress Note     S: Patient reports not feeling well due to very high pain level \"all over\" belly, 10/10.   No nausea or vomiting but has only had some crackers. Has not gotten up to ambulate yet. Unsure about how much she has been bleeding.      O:  Vitals:    03/05/20 0012 03/05/20 0030 03/05/20 0136 03/05/20 0230   BP: 102/53 98/57 100/57 102/53   Pulse: 72 71 74 82   Resp: 10  10 12   Temp:       TempSrc:       SpO2: 99%  98%    Weight:            Gen: NAD  CV: Regular rate  Lungs: Nonlabored on room air  Abd: soft, attp, bandage in place w/ minimal shadowing  Ext: warm and well perfused, SCDs in place    Hemoglobin   Date Value Ref Range Status   03/05/2020 11.5 (L) 11.7 - 15.7 g/dL Final   03/04/2020 11.9 11.7 - 15.7 g/dL Final       Assessment and Plan: Maria L Suazo is a 34 year old female POD#1 s/p XL, repair of uterine rupture. Poor pain control in postop period.    FEN:  advance diet as tolerated. Antiemetics as needed.  Pain: has scheduled 975mg tylenol, 15mg ketorolac. Ordered lidocaine patches and told nursing. She has oxycodone prn, 5-10mg, but had not had any since around 0200, so called nursing to give her 10 mg of oxycodone now.   : per nursing, got up to void after I rounded and voided spontaneously successfully  Heme: Hgb 11.9 >  > 11.5, appropriate  Rh pos    Encouraged ambulation.    Dispo: Anticipate discharge to home pending meeting postoperative goals.    Will check in again this afternoon to see how she is doing with pain.     Malorie Henley  Obstetrics and Gynecology PGY-1  3/5/2020 6:38 AM    "

## 2020-03-05 NOTE — ANESTHESIA PREPROCEDURE EVALUATION
Anesthesia Pre-Procedure Evaluation    Patient: Maria L Suazo   MRN:     [id number] Gender:   female   Age:    34 year old :      1985        Preoperative Diagnosis: Uterine perforation [S37.69XA]   Procedure(s):  LAPAROTOMY  HYSTERECTOMY, ABDOMINAL  DILATION AND CURETTAGE, UTERUS, USING SUCTION, Exploratory Laparotomy, Suction Dilation and Curettage, uterus, possible abdominal hysterectomy, possible bowel resection     LABS:  CBC:   Lab Results   Component Value Date    WBC 7.8 2020    HGB 11.9 2020    HCT 34.5 (L) 2020     2020     BMP:   Lab Results   Component Value Date     2020    POTASSIUM 3.4 2020    CHLORIDE 110 (H) 2020    CO2 21 2020    BUN 7 2020    CR 0.44 (L) 2020    GLC 83 2020     COAGS: No results found for: PTT, INR, FIBR  POC: No results found for: BGM, HCG, HCGS  OTHER:   Lab Results   Component Value Date    ALEENA 8.0 (L) 2020        Preop Vitals    BP Readings from Last 3 Encounters:   20 108/74    Pulse Readings from Last 3 Encounters:   20 70      Resp Readings from Last 3 Encounters:   20 9    SpO2 Readings from Last 3 Encounters:   20 100%      Temp Readings from Last 1 Encounters:   20 36.4  C (97.5  F)    Ht Readings from Last 1 Encounters:   No data found for Ht      Wt Readings from Last 1 Encounters:   No data found for Wt    There is no height or weight on file to calculate BMI.     LDA:  Peripheral IV 20 Left (Active)   Number of days: 0       Peripheral IV 20 Right Upper forearm (Active)   Site Assessment WDL 3/4/2020  5:53 PM   Line Status Saline locked 3/4/2020  5:53 PM   Number of days: 0        No past medical history on file.   No past surgical history on file.   No Known Allergies     Anesthesia Evaluation     .             ROS/MED HX    ENT/Pulmonary:  - neg pulmonary ROS     Neurologic:  - neg neurologic ROS     Cardiovascular:  - neg  cardiovascular ROS       METS/Exercise Tolerance:     Hematologic:  - neg hematologic  ROS       Musculoskeletal:  - neg musculoskeletal ROS       GI/Hepatic: Comment: IBS        Renal/Genitourinary: Comment: Perforated uterus, possible bowel injury        Endo:  - neg endo ROS       Psychiatric:     (+) psychiatric history other (comment) and anxiety (ADHD)      Infectious Disease:         Malignancy:      - no malignancy   Other:                         PHYSICAL EXAM:   Mental Status/Neuro: A/A/O   Airway: Facies: Feasible  Mallampati: II  Mouth/Opening: Full  TM distance: > 6 cm  Neck ROM: Full   Respiratory: Auscultation: CTAB     Resp. Rate: Normal     Resp. Effort: Normal      CV: Rhythm: Regular  Rate: Age appropriate  Heart: Normal Sounds  Edema: None   Comments:      Dental: Normal Dentition                Assessment:   ASA SCORE: 2 emergent   H&P: History and physical reviewed and following examination; no interval change.    NPO Status: ELEVATED Aspiration Risk/Unknown     Plan:   Anes. Type:  General; For Post-op pain in coordination with surgeon; Peripheral Nerve Block     Block Details: TAP; Exparel   Pre-Medication: None   Induction:  IV (RSI)   Airway: ETT; Oral   Access/Monitoring: PIV   Maintenance: Balanced     Postop Plan:   Postop Pain: Opioids; Regional  Postop Sedation/Airway: Not planned  Disposition: Inpatient/Admit     PONV Management:   Adult Risk Factors: Female, Postop Opioids   Prevention: Ondansetron     CONSENT: Direct conversation   Plan and risks discussed with: Patient   Blood Products: Consented (ALL Blood Products)                   Nila Herzog MD

## 2020-03-05 NOTE — ANESTHESIA POSTPROCEDURE EVALUATION
Anesthesia POST Procedure Evaluation    Patient: Maria L Suazo   MRN:     [id number] Gender:   female   Age:    34 year old :      1985        Preoperative Diagnosis: Uterine perforation [S37.69XA]   Procedure(s):  Exploratory Laparotomy, Evacuation of Uterine Contents and Repair of Uterus  Suction Dilation and Curettage  Dilation and curettage suction   Postop Comments: No value filed.     Anesthesia Type: General, For Post-op pain in coordination with surgeon, Peripheral Nerve Block       Disposition: Admission   Postop Pain Control: Uneventful            Sign Out: Well controlled pain   PONV: No   Neuro/Psych: Uneventful            Sign Out: Acceptable/Baseline neuro status   Airway/Respiratory: Uneventful            Sign Out: Acceptable/Baseline resp. status   CV/Hemodynamics: Uneventful            Sign Out: Acceptable CV status   Other NRE: NONE   DID A NON-ROUTINE EVENT OCCUR? No         Last Anesthesia Record Vitals:  CRNA VITALS  3/4/2020 2103 - 3/4/2020 2203      3/4/2020             Pulse:  76    SpO2:  99 %    Resp Rate (observed):  (!) 2          Last PACU Vitals:  Vitals Value Taken Time   BP 99/54 3/4/2020 10:00 PM   Temp 36.9  C (98.4  F) 3/4/2020  9:36 PM   Pulse 71 3/4/2020 10:00 PM   Resp 0 3/4/2020 10:13 PM   SpO2 100 % 3/4/2020 10:13 PM   Temp src     NIBP     Pulse     SpO2     Resp     Temp     Ht Rate     Temp 2     Vitals shown include unvalidated device data.      Electronically Signed By: Nila Herzog MD, 2020, 10:14 PM

## 2020-03-05 NOTE — PHARMACY-ADMISSION MEDICATION HISTORY
Admission Medication History status for the 3/4/2020 admission is complete.  See EPIC admission navigator for Prior to Admission medications.    Medication history sources: patient and [facility] at  [phone]   Medication history source reliability: Good    Medication adherence:  Good    Changes made to PTA medication list (reason)  Added: n/a  Deleted: n/a  Changed: n/a    Additional medication history information (including reliability of information, actions taken by pharmacist): patient was able to verify that she is not currently taking  Lorazepam (she stopped taking it a year ago), However, new prescription was ordered in February of 2020 but never picked up.     Time spent in this activity: 15 minutes     Medication history completed by: Alex MendozaD    Prior to Admission medications    Medication Sig Last Dose Taking? Auth Provider   amphetamine-dextroamphetamine (ADDERALL) 20 MG tablet Take 20 mg by mouth 2 times daily  Unknown at 0900  Reported, Patient   LORazepam (ATIVAN) 0.5 MG tablet Take 0.5 mg by mouth every 8 hours as needed for anxiety  More than a month at Unknown time  Reported, Patient

## 2020-03-05 NOTE — OP NOTE
[facility]  Full Operative Progress Note     Surgery Date:  3/4/2020    Surgeon:  Milly Wilson MD     Assistants:  Anabell Stafford MD, PGY-3    Pre-op Diagnosis:    - Incomplete AB 12w intrauterine pregnancy   - Uterine perforation      Post-op Diagnosis:    - Same as above      Procedure: Exploratory laparotomy, suction dilation and curettage, repair of uterine perforation    Anesthesia:GETA and TAP block     EBL: 300 cc    IVF: 1300 mL crystalloid    UOP: 300 mL clear urine at the end of the case    Drains: None    Specimens: products of conception    Complications: None apparent    Indications:   Maria L Suazo is a 34 year old admitted for uterine perforation during legal termination of pregnancy. Due to possibility of bowel injury or injury to other structures, exploratory laparotomy recommended.  The risks, benefits, and alternatives were discussed with the patient, and she agreed to proceed.     Findings:   On laparotomy approximately 50 ml of hemoperitoneum. Entire bowel examined in sequential fashion, no signs of bowel injury. Normal appearing omentum with no signs of injury. Uterus enlarged and boggy, size consistent with 12 weeks. Uterine perforation in two places, one perforation 5 mm in size midline anterior fundus and other sight of perforation in middle of anterior uterus approximately 1 cm in size. Both areas oozing. Bladder normal appearing with no signs of injury. Following repair of perforations, surgical sights hemostatic. Suction D&C performed with tissue visualized in suction canula. At end of case gritty endometrium. Products of conception examined, calvarium, spine, both upper extremities present. Lower extremities removed with prior procedure. Uterus firm at end of case with appropriate amount of bleeding.     Procedure Details:   The patient was brought to the OR, where adequate general anesthesia was administered.  She was placed in the dorsal lithotomy  position. She was prepped and draped in the usual sterile fashion. Cardona catheter was placed. A surgical time out was performed. A pfannenstiel skin incision was made with the scalpel, and carried down to the underlying fascia with electrocautery. The fascia was incised in the midline, and the incision was extended laterally with the electrocautery. The superior aspect of the fascia was grasped with the Kocher clamps and dissected off of the underlying rectus muscles with blunt dissection and electrocautery. Attention was then turned to the inferior aspect of the fascia, which was similarly dissected off of the underlying rectus muscles. The rectus muscles were  in the midline, and the peritoneum was entered bluntly, and the opening was extended with electrocautery. Upon entry into abdomen Phil retractor placed. Bowel was then sequentially ran, normal appearing with no evidence of bowel injury. Findings as listed above. Uterus enlarged and boggy, size consistent with 12 weeks. Uterine perforation in two places, one perforation 5 mm in size midline anterior fundus and other sight of perforation in middle of anterior uterus approximately 1 cm in size. Both areas oozing. Bladder normal appearing with no signs of injury. Following this attention was turned below with plan to complete suction D&C. A sterile speculum was placed in the vagina and the cervix was visualized. Ring placed on cervix.  The uterus was not sounded.  The cervix was progressively dilated with hayward dilators to 31 Estonian.  A size 12 suction tip was then placed into the cervical canal without difficulty.  Multiple passes were made with good return of tissue noted. Following this a size 11 suction tip was used, good return of tissue. Finally after uterus began to cramp down, size 7 was used to remove any remaining blood following procedure. Minimal return of tissue noted and final pass made with the suction currette to clear uterus of  remaining blood and tissue.  The ring was removed. The speculum was then removed. During procedure hand was placed over previous perforation sites to ensure no further perforations or damage occurred. Following this attention was turned to two areas of uterine perforation. These were closed with running locked 2-0 Vicryl. Following this patient received IM methergine and TXA. Uterus overall firm following uterotonics. Phil retractor removed. The fascia and rectus muscles were examined and areas of oozing were controlled with electrocautery. The fascia was closed with a running 0 Vicryl suture. The subcutaneous tissue was irrigated and areas of oozing were controlled with electrocautery. The subcutaneous tissue was less than 2 cm in thickness, and was therefore not closed. The skin was closed with 4-0 Monocryl and covered with a sterile dressing. At end of case patient received rectal cytotec. Cardona catheter removed.     All sponge, needle, and instrument counts were correct. The patient tolerated the procedure well, and was transferred to recovery in stable condition. Dr. Wilson was present and scrubbed for the entirety of the procedure.     Anabell Stafford MD   OB/GYN Resident PGY-3  3/4/2020 10:15 PM      I was present and scrubbed for the entire procedure. I have reviewed and edited the note.   Milly Wilson MD

## 2020-03-05 NOTE — PLAN OF CARE
BP 99/50   Pulse 80   Temp 97  F (36.1  C) (Oral)   Resp 12   Wt 75.8 kg (167 lb)   SpO2 97%   Breastfeeding No   VSS. On room air. A&Ox4. LS clear. Pt denies SOB and chest pain. Pt ambulating with assist of 1. BS active and audible in all quadrants. Pt passing gas, no BM yet. Voiding without difficulty. Some light vaginal bleeding. Skin intact ex for incision. Dressing on abdominal incision is c/d/i.Regular diet, tolerating well. Denies nausea and vomiting.  Pt denies numbness/tingling. PIV SL. Pt pain tolerable with Oxycodone Q3H, one time dose of atarax, scheduled Toradol and tylenol, and PRN IV Dilaudid (given once). Pain is mostly in lower abdomen. Continue to monitor. Pt able to make needs known, call light within reach.

## 2020-03-06 VITALS
HEART RATE: 100 BPM | DIASTOLIC BLOOD PRESSURE: 50 MMHG | WEIGHT: 167 LBS | TEMPERATURE: 97.1 F | RESPIRATION RATE: 16 BRPM | SYSTOLIC BLOOD PRESSURE: 106 MMHG | OXYGEN SATURATION: 98 %

## 2020-03-06 LAB
GLUCOSE BLDC GLUCOMTR-MCNC: 84 MG/DL (ref 70–99)
HGB BLD-MCNC: 10.7 G/DL (ref 11.7–15.7)

## 2020-03-06 PROCEDURE — 25000132 ZZH RX MED GY IP 250 OP 250 PS 637: Performed by: STUDENT IN AN ORGANIZED HEALTH CARE EDUCATION/TRAINING PROGRAM

## 2020-03-06 PROCEDURE — 36415 COLL VENOUS BLD VENIPUNCTURE: CPT | Performed by: OBSTETRICS & GYNECOLOGY

## 2020-03-06 PROCEDURE — 25000128 H RX IP 250 OP 636: Performed by: STUDENT IN AN ORGANIZED HEALTH CARE EDUCATION/TRAINING PROGRAM

## 2020-03-06 PROCEDURE — 85018 HEMOGLOBIN: CPT | Performed by: OBSTETRICS & GYNECOLOGY

## 2020-03-06 PROCEDURE — 00000146 ZZHCL STATISTIC GLUCOSE BY METER IP

## 2020-03-06 PROCEDURE — 85018 HEMOGLOBIN: CPT | Performed by: STUDENT IN AN ORGANIZED HEALTH CARE EDUCATION/TRAINING PROGRAM

## 2020-03-06 RX ORDER — IBUPROFEN 600 MG/1
600 TABLET, FILM COATED ORAL EVERY 6 HOURS
Qty: 45 TABLET | Refills: 1 | Status: SHIPPED | OUTPATIENT
Start: 2020-03-06

## 2020-03-06 RX ORDER — HYDROXYZINE HYDROCHLORIDE 25 MG/1
25-50 TABLET, FILM COATED ORAL 3 TIMES DAILY PRN
Status: DISCONTINUED | OUTPATIENT
Start: 2020-03-06 | End: 2020-03-06 | Stop reason: HOSPADM

## 2020-03-06 RX ORDER — IBUPROFEN 600 MG/1
600 TABLET, FILM COATED ORAL EVERY 6 HOURS PRN
Status: DISCONTINUED | OUTPATIENT
Start: 2020-03-06 | End: 2020-03-06

## 2020-03-06 RX ORDER — CYCLOBENZAPRINE HCL 10 MG
10 TABLET ORAL 3 TIMES DAILY PRN
Status: DISCONTINUED | OUTPATIENT
Start: 2020-03-06 | End: 2020-03-06

## 2020-03-06 RX ORDER — HYDROXYZINE HYDROCHLORIDE 25 MG/1
25-50 TABLET, FILM COATED ORAL 3 TIMES DAILY PRN
Qty: 15 TABLET | Refills: 0 | Status: SHIPPED | OUTPATIENT
Start: 2020-03-06

## 2020-03-06 RX ORDER — IBUPROFEN 600 MG/1
600 TABLET, FILM COATED ORAL EVERY 6 HOURS
Status: DISCONTINUED | OUTPATIENT
Start: 2020-03-06 | End: 2020-03-06 | Stop reason: HOSPADM

## 2020-03-06 RX ORDER — HYDROXYZINE HYDROCHLORIDE 25 MG/1
100 TABLET, FILM COATED ORAL 3 TIMES DAILY PRN
Status: DISCONTINUED | OUTPATIENT
Start: 2020-03-06 | End: 2020-03-06

## 2020-03-06 RX ORDER — LIDOCAINE 4 G/G
2 PATCH TOPICAL
Status: DISCONTINUED | OUTPATIENT
Start: 2020-03-06 | End: 2020-03-06 | Stop reason: HOSPADM

## 2020-03-06 RX ORDER — OXYCODONE HYDROCHLORIDE 5 MG/1
20 TABLET ORAL EVERY 6 HOURS PRN
Qty: 6 TABLET | Refills: 0 | Status: SHIPPED | OUTPATIENT
Start: 2020-03-06 | End: 2020-03-06

## 2020-03-06 RX ORDER — OXYCODONE HYDROCHLORIDE 5 MG/1
20 TABLET ORAL EVERY 6 HOURS PRN
Qty: 20 TABLET | Refills: 0 | Status: SHIPPED | OUTPATIENT
Start: 2020-03-06

## 2020-03-06 RX ORDER — OXYCODONE HYDROCHLORIDE 5 MG/1
5 TABLET ORAL EVERY 4 HOURS PRN
Status: DISCONTINUED | OUTPATIENT
Start: 2020-03-06 | End: 2020-03-06 | Stop reason: HOSPADM

## 2020-03-06 RX ORDER — ACETAMINOPHEN 325 MG/1
650 TABLET ORAL EVERY 4 HOURS PRN
Qty: 45 TABLET | Refills: 1 | Status: SHIPPED | OUTPATIENT
Start: 2020-03-07

## 2020-03-06 RX ADMIN — IBUPROFEN 600 MG: 600 TABLET ORAL at 12:06

## 2020-03-06 RX ADMIN — KETOROLAC TROMETHAMINE 15 MG: 15 INJECTION, SOLUTION INTRAMUSCULAR; INTRAVENOUS at 06:08

## 2020-03-06 RX ADMIN — Medication 0.2 MG: at 10:33

## 2020-03-06 RX ADMIN — ACETAMINOPHEN 975 MG: 325 TABLET, FILM COATED ORAL at 16:29

## 2020-03-06 RX ADMIN — SENNOSIDES AND DOCUSATE SODIUM 2 TABLET: 8.6; 5 TABLET ORAL at 08:02

## 2020-03-06 RX ADMIN — OXYCODONE HYDROCHLORIDE 5 MG: 5 TABLET ORAL at 16:31

## 2020-03-06 RX ADMIN — ACETAMINOPHEN 975 MG: 325 TABLET, FILM COATED ORAL at 06:07

## 2020-03-06 RX ADMIN — OXYCODONE HYDROCHLORIDE 5 MG: 5 TABLET ORAL at 08:00

## 2020-03-06 RX ADMIN — OXYCODONE HYDROCHLORIDE 10 MG: 5 TABLET ORAL at 02:30

## 2020-03-06 ASSESSMENT — ACTIVITIES OF DAILY LIVING (ADL)
ADLS_ACUITY_SCORE: 12
ADLS_ACUITY_SCORE: 10
ADLS_ACUITY_SCORE: 10
ADLS_ACUITY_SCORE: 12

## 2020-03-06 NOTE — PLAN OF CARE
Vitals: BP 99/57 (BP Location: Left arm)   Pulse 83   Temp 97.4  F (36.3  C) (Oral)   Resp 16   Wt 75.8 kg (167 lb)   SpO2 96%   Breastfeeding No   Lung sounds clear and equal bilaterally. Denies CP, SOB.   Output: Voids spontaneously. Did not void on this shift  Last BM: 3/4/20   Activity: Stand  by assist   Skin: Intact ex for incision   Pain: Pt reports pain in her abdomen, being controlled with PRN oxy Q3 and scheduled Toradol.   CMS/Neuro: Intact. A&O x 4   Dressing: CDI   Diet: Regular. Pt ate toast w/o n/v   LDA: PIV SL   Equipment: Personal belongings   Plan/Add'l info: Able to make needs known. Call light within reach. Continue with POC.  Discharge plan: Pt may be going home today

## 2020-03-06 NOTE — PLAN OF CARE
VS:   BP 96/49 (BP Location: Left arm)   Pulse 70   Temp 97.7  F (36.5  C) (Oral)   Resp 14   Wt 75.8 kg (167 lb)   SpO2 98%   Breastfeeding No    LS clear denies SOB reports chest pain but lower rib to sternum pain with some sharpness in shoulders is improving with movement   Output:   Voiding spontaneously. BS+ flatus+ BM 3/3 minimal vaginal bleeding   Activity:   Independent   Skin: Intact ex incision   Pain:   Iv toradol, oxycodone and tylenol given with relief   Neuro/CMS:   Intact denies n/t   Dressing(s):   CDI   Diet:   reg   LDA:   PIV SL   Equipment:   Call light within reach   Plan:      Additional Info:

## 2020-03-06 NOTE — PROGRESS NOTES
Gynecology Progress Note     S: Patient's pain is improved. Was 10/10 yesterday, today reports 7/10. Also is passing gas now (was not yesterday). Continues to tolerate reg diet w/o nausea or vomiting. Bleeding minimal. Has been ambulating but mainly just to the bathroom.     O:  Vitals:    03/05/20 0230 03/05/20 0700 03/05/20 1639 03/06/20 0050   BP: 102/53 99/50 96/49 95/44   BP Location:   Left arm Left arm   Pulse: 82 80 70 69   Resp: 12 12 14 16   Temp:   97.7  F (36.5  C) 97.9  F (36.6  C)   TempSrc:   Oral Oral   SpO2:  97% 98% 97%   Weight:            Gen: NAD  CV: Regular rate  Lungs: Nonlabored on room air  Abd: soft, attp, no rebound or guarding, bandage in place w/ no shadowing  Ext: warm and well perfused, SCDs in place    Hemoglobin   Date Value Ref Range Status   03/05/2020 11.5 (L) 11.7 - 15.7 g/dL Final   03/04/2020 11.9 11.7 - 15.7 g/dL Final       Assessment and Plan: Maria L Suazo is a 34 year old female POD#2 s/p XL, repair of uterine rupture. Poor pain control in postop period.     FEN:  advance diet as tolerated. Antiemetics as needed.  Pain: has scheduled 975mg tylenol, 15mg ketorolac, oxycodone prn  will discontinue dilaudid and switch ketorolac to ibuprofen. Will decrease oxycodone from 5-10 q3 to 5 q4  Will have nursing get her an abdominal binder and heat packs  : voiding spontaneously  Heme: Hgb 11.9 >  > 11.5 > 10.7 today  Wound: c/d/i, bandage removed  Rh pos    Encouraged ambulation and PO hydration    Dispo: Anticipate discharge to home pending meeting postoperative goals. Possibly this afternoon given improved pain control.    Malorie Henley  Obstetrics and Gynecology PGY-1  3/6/20 0658    [facility] staff:  Appreciate note by Dr. Henley.  I have seen and examined the patient without the resident. I have reviewed, edited, and agree with the note.    My findings are: pt appears to be sleepy and overly affected by narcotic meds.  States she had fallen  asleep with food in her mouth earlier.  Plan to discharge IV dilaudid and change to only oral meds. Consider discharge home later today if pain control ok.     Rocío Limon MD, FACOG  3/6/2020  11:29 AM

## 2020-03-06 NOTE — PLAN OF CARE
"VSS. Up indep to bathroom, voiding without difficulty. BS +, passing flatus. C/o moderate abd pain, declined atarax, c/o \"too sleepy, bad dreams\" from previous dose. Managed with oxycodone, IV dilaudid x1 for breakthrough pain. Pt became very sleepy after dilaudid. Pain meds adjusted by MD. Ibuprofen offered, pt refused \"later, not now\". Pt is tolerating reg diet, no nausea. Dsg is CDI. Pt able to make needs known, possible discharge later today.  "

## 2020-03-07 NOTE — PLAN OF CARE
All discharge paper work reviewed with patient, discharge teaching complete and all questions answered. Discharge medications and oxycodone script given to patient.  Pt will let RN know once her ride has arrived to discharge unit.

## 2020-03-08 LAB
BLD PROD TYP BPU: NORMAL
BLD UNIT ID BPU: 0
BLOOD PRODUCT CODE: NORMAL
BPU ID: NORMAL
TRANSFUSION STATUS PATIENT QL: NORMAL
TRANSFUSION STATUS PATIENT QL: NORMAL

## 2020-03-09 ENCOUNTER — APPOINTMENT (OUTPATIENT)
Dept: CT IMAGING | Facility: CLINIC | Age: 35
End: 2020-03-09
Attending: EMERGENCY MEDICINE
Payer: MEDICAID

## 2020-03-09 ENCOUNTER — HOSPITAL ENCOUNTER (EMERGENCY)
Facility: CLINIC | Age: 35
Discharge: HOME | End: 2020-03-09
Attending: EMERGENCY MEDICINE | Admitting: EMERGENCY MEDICINE
Payer: MEDICAID

## 2020-03-09 VITALS
WEIGHT: 170.3 LBS | HEART RATE: 88 BPM | TEMPERATURE: 98.7 F | SYSTOLIC BLOOD PRESSURE: 127 MMHG | BODY MASS INDEX: 28.37 KG/M2 | DIASTOLIC BLOOD PRESSURE: 88 MMHG | HEIGHT: 65 IN | OXYGEN SATURATION: 100 % | RESPIRATION RATE: 18 BRPM

## 2020-03-09 DIAGNOSIS — T40.2X5A CONSTIPATION DUE TO OPIOID THERAPY: ICD-10-CM

## 2020-03-09 DIAGNOSIS — K59.03 CONSTIPATION DUE TO OPIOID THERAPY: ICD-10-CM

## 2020-03-09 DIAGNOSIS — R10.84 ABDOMINAL PAIN, GENERALIZED: ICD-10-CM

## 2020-03-09 LAB
ALBUMIN SERPL-MCNC: 3.1 G/DL (ref 3.4–5)
ALBUMIN UR-MCNC: NEGATIVE MG/DL
ALP SERPL-CCNC: 80 U/L (ref 40–150)
ALT SERPL W P-5'-P-CCNC: 41 U/L (ref 0–50)
AMORPH CRY #/AREA URNS HPF: ABNORMAL /HPF
ANION GAP SERPL CALCULATED.3IONS-SCNC: 5 MMOL/L (ref 3–14)
APPEARANCE UR: ABNORMAL
AST SERPL W P-5'-P-CCNC: 30 U/L (ref 0–45)
BACTERIA #/AREA URNS HPF: ABNORMAL /HPF
BASOPHILS # BLD AUTO: 0 10E9/L (ref 0–0.2)
BASOPHILS NFR BLD AUTO: 0.3 %
BILIRUB SERPL-MCNC: 0.3 MG/DL (ref 0.2–1.3)
BILIRUB UR QL STRIP: NEGATIVE
BUN SERPL-MCNC: 9 MG/DL (ref 7–30)
CALCIUM SERPL-MCNC: 9.1 MG/DL (ref 8.5–10.1)
CHLORIDE SERPL-SCNC: 105 MMOL/L (ref 94–109)
CO2 SERPL-SCNC: 30 MMOL/L (ref 20–32)
COLOR UR AUTO: YELLOW
COPATH REPORT: NORMAL
CREAT SERPL-MCNC: 0.53 MG/DL (ref 0.52–1.04)
DIFFERENTIAL METHOD BLD: NORMAL
EOSINOPHIL # BLD AUTO: 0.3 10E9/L (ref 0–0.7)
EOSINOPHIL NFR BLD AUTO: 3.3 %
ERYTHROCYTE [DISTWIDTH] IN BLOOD BY AUTOMATED COUNT: 13.3 % (ref 10–15)
GFR SERPL CREATININE-BSD FRML MDRD: >90 ML/MIN/{1.73_M2}
GLUCOSE SERPL-MCNC: 74 MG/DL (ref 70–99)
GLUCOSE UR STRIP-MCNC: NEGATIVE MG/DL
HCT VFR BLD AUTO: 36.2 % (ref 35–47)
HGB BLD-MCNC: 12.5 G/DL (ref 11.7–15.7)
HGB UR QL STRIP: NEGATIVE
IMM GRANULOCYTES # BLD: 0 10E9/L (ref 0–0.4)
IMM GRANULOCYTES NFR BLD: 0.3 %
KETONES UR STRIP-MCNC: NEGATIVE MG/DL
LEUKOCYTE ESTERASE UR QL STRIP: NEGATIVE
LIPASE SERPL-CCNC: 77 U/L (ref 73–393)
LYMPHOCYTES # BLD AUTO: 2.2 10E9/L (ref 0.8–5.3)
LYMPHOCYTES NFR BLD AUTO: 28.8 %
MCH RBC QN AUTO: 31.6 PG (ref 26.5–33)
MCHC RBC AUTO-ENTMCNC: 34.5 G/DL (ref 31.5–36.5)
MCV RBC AUTO: 92 FL (ref 78–100)
MONOCYTES # BLD AUTO: 0.4 10E9/L (ref 0–1.3)
MONOCYTES NFR BLD AUTO: 4.7 %
MUCOUS THREADS #/AREA URNS LPF: PRESENT /LPF
NEUTROPHILS # BLD AUTO: 4.8 10E9/L (ref 1.6–8.3)
NEUTROPHILS NFR BLD AUTO: 62.6 %
NITRATE UR QL: NEGATIVE
NRBC # BLD AUTO: 0 10*3/UL
NRBC BLD AUTO-RTO: 0 /100
PH UR STRIP: 7 PH (ref 5–7)
PLATELET # BLD AUTO: 203 10E9/L (ref 150–450)
POTASSIUM SERPL-SCNC: 3.5 MMOL/L (ref 3.4–5.3)
PROT SERPL-MCNC: 6.8 G/DL (ref 6.8–8.8)
RBC # BLD AUTO: 3.95 10E12/L (ref 3.8–5.2)
RBC #/AREA URNS AUTO: 1 /HPF (ref 0–2)
SODIUM SERPL-SCNC: 140 MMOL/L (ref 133–144)
SOURCE: ABNORMAL
SP GR UR STRIP: 1.02 (ref 1–1.03)
SQUAMOUS #/AREA URNS AUTO: 7 /HPF (ref 0–1)
UROBILINOGEN UR STRIP-MCNC: NORMAL MG/DL (ref 0–2)
WBC # BLD AUTO: 7.7 10E9/L (ref 4–11)
WBC #/AREA URNS AUTO: 2 /HPF (ref 0–5)

## 2020-03-09 PROCEDURE — 99284 EMERGENCY DEPT VISIT MOD MDM: CPT | Mod: Z6 | Performed by: EMERGENCY MEDICINE

## 2020-03-09 PROCEDURE — 25000132 ZZH RX MED GY IP 250 OP 250 PS 637: Performed by: EMERGENCY MEDICINE

## 2020-03-09 PROCEDURE — 99284 EMERGENCY DEPT VISIT MOD MDM: CPT | Mod: 25 | Performed by: EMERGENCY MEDICINE

## 2020-03-09 PROCEDURE — 80053 COMPREHEN METABOLIC PANEL: CPT | Performed by: EMERGENCY MEDICINE

## 2020-03-09 PROCEDURE — 74176 CT ABD & PELVIS W/O CONTRAST: CPT

## 2020-03-09 PROCEDURE — 85025 COMPLETE CBC W/AUTO DIFF WBC: CPT | Performed by: EMERGENCY MEDICINE

## 2020-03-09 PROCEDURE — 83690 ASSAY OF LIPASE: CPT | Performed by: EMERGENCY MEDICINE

## 2020-03-09 PROCEDURE — 81001 URINALYSIS AUTO W/SCOPE: CPT | Performed by: EMERGENCY MEDICINE

## 2020-03-09 RX ORDER — SENNA AND DOCUSATE SODIUM 50; 8.6 MG/1; MG/1
1 TABLET, FILM COATED ORAL 2 TIMES DAILY PRN
Qty: 30 TABLET | Refills: 0 | Status: SHIPPED | OUTPATIENT
Start: 2020-03-09

## 2020-03-09 RX ORDER — IOPAMIDOL 755 MG/ML
100 INJECTION, SOLUTION INTRAVASCULAR ONCE
Status: DISCONTINUED | OUTPATIENT
Start: 2020-03-09 | End: 2020-03-09 | Stop reason: HOSPADM

## 2020-03-09 RX ORDER — ACETAMINOPHEN 325 MG/1
650 TABLET ORAL ONCE
Status: COMPLETED | OUTPATIENT
Start: 2020-03-09 | End: 2020-03-09

## 2020-03-09 RX ORDER — OXYCODONE HYDROCHLORIDE 5 MG/1
5 TABLET ORAL ONCE
Status: COMPLETED | OUTPATIENT
Start: 2020-03-09 | End: 2020-03-09

## 2020-03-09 RX ORDER — NAPROXEN 500 MG/1
500 TABLET ORAL 2 TIMES DAILY PRN
Qty: 30 TABLET | Refills: 0 | Status: SHIPPED | OUTPATIENT
Start: 2020-03-09

## 2020-03-09 RX ADMIN — ACETAMINOPHEN 650 MG: 325 TABLET, FILM COATED ORAL at 04:23

## 2020-03-09 RX ADMIN — OXYCODONE HYDROCHLORIDE 5 MG: 5 TABLET ORAL at 04:23

## 2020-03-09 ASSESSMENT — MIFFLIN-ST. JEOR: SCORE: 1473.36

## 2020-03-09 NOTE — DISCHARGE INSTRUCTIONS
Please make an appointment to follow up with Your Primary Care Provider in 3-6 days.     Return to the ED if you are having fever, spreading redness/swelling/warmth from the surgical site, worsening symptoms, or any urgent/life-threatening concerns.

## 2020-03-09 NOTE — ED AVS SNAPSHOT
[facility], [place], Emergency Department  [address]  [facility] [address]  Phone:  [phone]  Fax:  [phone]                                    Mraia L Suazo   MRN: [id number]    Department:  [facility], Emergency Department   Date of Visit:  3/9/2020           After Visit Summary Signature Page    I have received my discharge instructions, and my questions have been answered. I have discussed any challenges I see with this plan with the nurse or doctor.    ..........................................................................................................................................  Patient/Patient Representative Signature      ..........................................................................................................................................  Patient Representative Print Name and Relationship to Patient    ..................................................               ................................................  Date                                   Time    ..........................................................................................................................................  Reviewed by Signature/Title    ...................................................              ..............................................  Date                                               Time          22EPIC Rev 08/18

## 2020-03-09 NOTE — ED TRIAGE NOTES
Pt had D & C at [facility], was transferred here for repair of uterus on 3/4/20.  Pt having increased pain and bloating.  Pt has not had a BM since surgery.

## 2020-03-09 NOTE — ED PROVIDER NOTES
"    [facility] ([facility])  3/09/20    History     Chief Complaint   Patient presents with     Post-op Problem     Pt had D & C at [facility], was transferred here for repair of uterus on 3/4/20.  Pt having increased pain and bloating.  Pt has not had a BM since surgery.     [facility]  Maria L Suazo is a 34 year old female with PMH notable for uterine rupture s/p repair on 3/4/2020 after D&C who presents to the ED with abdominal pain. Patient was discharged 2.5 days ago on 3/6/2020. Since then, patient reports continued lower abdominal pain.  This is been worsening over the last day or 2.  Patient points to area around her incision is primary location of pain.  Generalized abdominal distention and lack of bowel movements.  She is passing some gas but has not had any bowel movement since hospital discharge.  She is nauseous but not vomiting.  She has had gradually decreasing bloody vaginal discharge since the procedure.  No dysuria.      I have reviewed the Medications, Allergies, Past Medical and Surgical History, and Social History in the Epic system.    Review of Systems  A complete review of systems was performed with pertinent positives and negatives noted in the [facility], and all other systems negative.     Physical Exam   BP: 120/77  Pulse: 88  Heart Rate: 87  Temp: 98.7  F (37.1  C)  Resp: 18  Height: 165.1 cm (5' 5\")  Weight: 77.2 kg (170 lb 4.8 oz)  SpO2: 100 %    Physical Exam  General: no acute distress. Appears stated age.   HENT: MMM, no oropharyngeal lesions  Eyes: PERRL, normal sclerae  Cardio: regular rate. Regular rhythm. Extremities well perfused  Resp: Normal work of breathing, clear breath sounds  Chest/Back: no visual signs of trauma, no CVA tenderness  Abdomen: diffuse mild tenderness maximal in bilateral lower abdomen, mildly distended without fluid wave, no rebound, no guarding. Low transverse incision intact, no erythema, no warmth, no drainage.   Neuro: alert " and fully oriented. CN II-XII grossly intact. Grossly normal strength and sensation in all extremities.   MSK: no deformities.   Integumentary/Skin: no rash visualized, normal color  Psych: normal affect, normal behavior    ED Course      Procedures           Labs Ordered and Resulted from Time of ED Arrival Up to the Time of Departure from the ED   COMPREHENSIVE METABOLIC PANEL - Abnormal; Notable for the following components:       Result Value    Albumin 3.1 (*)     All other components within normal limits   ROUTINE UA WITH MICROSCOPIC REFLEX TO CULTURE - Abnormal; Notable for the following components:    Bacteria Urine Few (*)     Squamous Epithelial /HPF Urine 7 (*)     Mucous Urine Present (*)     Amorphous Crystals Few (*)     All other components within normal limits   CBC WITH PLATELETS DIFFERENTIAL   LIPASE   PERIPHERAL IV CATHETER     CT Abdomen Pelvis w/o Contrast   Final Result   IMPRESSION:    1.  Free intraperitoneal air with history of recent uterine rupture and surgical repair.   2.  Large amount of intraluminal fecal debris throughout the colon.   3.  Previous cholecystectomy.   4.  No evidence of obstruction.                Assessments & Plan (with Medical Decision Making)   Patient presenting with abdominal distention, lack of balance, abdominal pain after surgery for uterine rupture.  Vitals in ED unremarkable.  Nursing notes reviewed.  Initial differential diagnosis includes related to ileus, SBO, intestinal injury, surgical site infection.     Surgical site appears externally to be healing well, no dehiscence, no clinical signs of infection.  No leukocytosis nor fever.  UA without evidence of urinary infection.  Electrolytes unremarkable.  CT abdomen pelvis demonstrates large stool burden but no evidence of obstruction, some residual free air likely due to recent surgery.    In ED patient symptoms are managed with acetaminophen and oxycodone with improvement in symptoms upon reassessment.   OB/GYN was consulted and also saw the patient, and answered further questions relating to her the procedure and recent hospital discharge.    The complete clinical picture is most consistent with abdominal distention and 2 day old induced constipation.  After counseling diagnosed work-up and treatment patient was discharged to home.  Patient was advised to follow-up OB/GYN or primary care in a few days.  Patient advised to return to ED if worsening symptoms, fever, or any other urgent/life-threatening concerns.    This part of the medical record was transcribed by Mando Puentes, Medical Scribe, from a dictation done by Charly Thurston MD.       Final diagnoses:   Abdominal pain, generalized   Constipation due to opioid therapy     Discharge Medication List as of 3/9/2020  4:37 AM      START taking these medications    Details   magnesium citrate solution Take 296 mLs by mouth once for 1 dose, Disp-296 mL,R-0, Local Print      SENNA-docusate sodium (SENNA S) 8.6-50 MG tablet Take 1 tablet by mouth 2 times daily as needed (constipation), Disp-30 tablet,R-0, Local Print             --  Charly Thurston MD   Emergency Medicine   [facility] EMERGENCY DEPARTMENT  3/9/2020     Charly Thurston MD  03/10/20 0215

## 2020-03-09 NOTE — CONSULTS
"Gynecology Consult Note    Referring Physician: Charly Thurston MD  Reason for Consult: Post operative constipation    HPI: Maria L Suazo is a 34 year old , POD#5 s/p exploratory laparotomy following uterine perforation sustained during dilation and curettage. Exploratory laparotomy revealed one 5mm and one 1cm uterine perforations which were repaired. The bowel was run with no evidence of bowel injury. Today Maria L presents to the ED with complaint of constipation and associated abdominal pain and bloating. She reports no BM since prior to surgery despite taking stool softeners. She is unsure of which stool softeners she is taking but reports that they normally work well for her. She otherwise feel that her pain is well controlled. She is tolerating PO without nausea and vomiting. Ambulating without dizziness or lightheadedness. Has scant vaginal bleeding and is voiding spontaneously. She is passing gas.     OBHx:    x3  TOP D&C x1    PMH:   ADHD  Anxiety    PSH:   Past Surgical History:   Procedure Laterality Date     DILATION AND CURETTAGE SUCTION N/A 3/4/2020    Procedure: Suction Dilation and Curettage;  Surgeon: Milly Wilson MD;  Location: [facility] OR     LAPAROTOMY EXPLORATORY N/A 3/4/2020    Procedure: Exploratory Laparotomy, Evacuation of Uterine Contents and Repair of Uterus;  Surgeon: Milly Wilson MD;  Location: [facility] OR       Social Hx:   Social History     Tobacco Use     Smoking status: Current Every Day Smoker     Packs/day: 0.50     Years: 10.00     Pack years: 5.00     Types: Cigarettes     Smokeless tobacco: Never Used   Substance Use Topics     Alcohol use: None     Drug use: None        Family History: family history is not on file.     ROS:   Negative except per HPI    Objective:   /77   Pulse 88   Temp 98.7  F (37.1  C) (Oral)   Resp 18   Ht 1.651 m (5' 5\")   Wt 77.2 kg (170 lb 4.8 oz)   SpO2 100%   BMI 28.34 kg/m    Constitutional: Healthy appearing  female, " no acute distress  Cardiovascular: Regular rate, well perfused  Respiratory: no increased work of breathing   Gastrointestinal: Abdomen soft, non-tender. BS normal. No masses, organomegaly  Incision: CDI, well healing, non-erythematous, non-indurated  Psychiatric: mentation appears normal and affect normal/bright    Labs/Imaging:  Results for orders placed or performed during the hospital encounter of 03/09/20   CT Abdomen Pelvis w/o Contrast     Status: None    Narrative    EXAM: CT ABDOMEN PELVIS W/O CONTRAST  LOCATION: [facility]  DATE/TIME: 3/9/2020 2:14 AM    INDICATION: Uterine rupture. Recent surgery. Constipation.  COMPARISON: None.  TECHNIQUE: CT scan of the abdomen and pelvis was performed without IV contrast. Multiplanar reformats were obtained. Dose reduction techniques were used.  CONTRAST: None.    FINDINGS:   LOWER CHEST: Normal.    HEPATOBILIARY: Previous cholecystectomy. Free intraperitoneal air upper abdomen with history of recent surgery.    PANCREAS: Unremarkable.    SPLEEN: The spleen is not enlarged.    ADRENAL GLANDS: No adrenal nodule.    KIDNEYS/BLADDER: No hydronephrosis. The left kidney demonstrates 11 mm cyst, no further workup necessary.    BOWEL: Stomach filled gastric contents. Large amount of intraluminal fecal debris cecum, ascending, transverse and descending colon. No CT evidence of appendicitis.    LYMPH NODES: No adenopathy.    VASCULATURE: Unremarkable.    PELVIC ORGANS: Uterus mildly enlarged. No free fluid in the pelvis.    MUSCULOSKELETAL: No acute osseous abnormality. There is a 1.3 cm ventral umbilical hernia which contains small amount of air. Postsurgical changes ventral infraumbilical region with air and subcutaneous stranding.      Impression    IMPRESSION:   1.  Free intraperitoneal air with history of recent uterine rupture and surgical repair.  2.  Large amount of intraluminal fecal debris throughout the colon.  3.  Previous cholecystectomy.  4.  No  evidence of obstruction.     CBC with platelets differential     Status: None   Result Value Ref Range    WBC 7.7 4.0 - 11.0 10e9/L    RBC Count 3.95 3.8 - 5.2 10e12/L    Hemoglobin 12.5 11.7 - 15.7 g/dL    Hematocrit 36.2 35.0 - 47.0 %    MCV 92 78 - 100 fl    MCH 31.6 26.5 - 33.0 pg    MCHC 34.5 31.5 - 36.5 g/dL    RDW 13.3 10.0 - 15.0 %    Platelet Count 203 150 - 450 10e9/L    Diff Method Automated Method     % Neutrophils 62.6 %    % Lymphocytes 28.8 %    % Monocytes 4.7 %    % Eosinophils 3.3 %    % Basophils 0.3 %    % Immature Granulocytes 0.3 %    Nucleated RBCs 0 0 /100    Absolute Neutrophil 4.8 1.6 - 8.3 10e9/L    Absolute Lymphocytes 2.2 0.8 - 5.3 10e9/L    Absolute Monocytes 0.4 0.0 - 1.3 10e9/L    Absolute Eosinophils 0.3 0.0 - 0.7 10e9/L    Absolute Basophils 0.0 0.0 - 0.2 10e9/L    Abs Immature Granulocytes 0.0 0 - 0.4 10e9/L    Absolute Nucleated RBC 0.0    Comprehensive metabolic panel     Status: Abnormal   Result Value Ref Range    Sodium 140 133 - 144 mmol/L    Potassium 3.5 3.4 - 5.3 mmol/L    Chloride 105 94 - 109 mmol/L    Carbon Dioxide 30 20 - 32 mmol/L    Anion Gap 5 3 - 14 mmol/L    Glucose 74 70 - 99 mg/dL    Urea Nitrogen 9 7 - 30 mg/dL    Creatinine 0.53 0.52 - 1.04 mg/dL    GFR Estimate >90 >60 mL/min/[1.73_m2]    GFR Estimate If Black >90 >60 mL/min/[1.73_m2]    Calcium 9.1 8.5 - 10.1 mg/dL    Bilirubin Total 0.3 0.2 - 1.3 mg/dL    Albumin 3.1 (L) 3.4 - 5.0 g/dL    Protein Total 6.8 6.8 - 8.8 g/dL    Alkaline Phosphatase 80 40 - 150 U/L    ALT 41 0 - 50 U/L    AST 30 0 - 45 U/L   Lipase     Status: None   Result Value Ref Range    Lipase 77 73 - 393 U/L   UA with Microscopic reflex to Culture     Status: Abnormal    Specimen: Urine clean catch; Midstream Urine   Result Value Ref Range    Color Urine Yellow     Appearance Urine Slightly Cloudy     Glucose Urine Negative NEG^Negative mg/dL    Bilirubin Urine Negative NEG^Negative    Ketones Urine Negative NEG^Negative mg/dL     Specific Gravity Urine 1.017 1.003 - 1.035    Blood Urine Negative NEG^Negative    pH Urine 7.0 5.0 - 7.0 pH    Protein Albumin Urine Negative NEG^Negative mg/dL    Urobilinogen mg/dL Normal 0.0 - 2.0 mg/dL    Nitrite Urine Negative NEG^Negative    Leukocyte Esterase Urine Negative NEG^Negative    Source Midstream Urine     WBC Urine 2 0 - 5 /HPF    RBC Urine 1 0 - 2 /HPF    Bacteria Urine Few (A) NEG^Negative /HPF    Squamous Epithelial /HPF Urine 7 (H) 0 - 1 /HPF    Mucous Urine Present (A) NEG^Negative /LPF    Amorphous Crystals Few (A) NEG^Negative /HPF        Assessment/Plan:   Maria L Suazo is a 34 year old  POD#5 s/p XL indicated by uterine perforation sustained while undergoing suction D&C who presents with constipation. Post-operative course otherwise uncomplicated. Abdomen soft and non-tender. No signs of post-operative infection. CT demonstrates heavy stool burden without signs of obstruction.  Discussed scheduled bowel regimen. Options may include miralax, senna, and PRN suppository. Recommend starting with suppository tonight. May also consider mag citrate although patient may experience increase in abdominal cramping.     Recommend outpatient postoperative follow-up in 2 weeks for incision check. Patient may call [phone] to schedule.     Discussed with Dr. Limon.     Alicia Myhre, DO  OB/GYN Resident, PGY-3  3/9/2020 4:07 AM      [facility] staff:  Appreciate note by Dr. Myhre.  I have reviewed, edited, and agree with the note.        Rocío Limon MD, FACOG  3/9/2020  7:16 AM

## 2020-11-16 ENCOUNTER — HEALTH MAINTENANCE LETTER (OUTPATIENT)
Age: 35
End: 2020-11-16

## 2021-03-01 ENCOUNTER — MEDICAL CORRESPONDENCE (OUTPATIENT)
Dept: HEALTH INFORMATION MANAGEMENT | Facility: CLINIC | Age: 36
End: 2021-03-01

## 2021-03-01 ENCOUNTER — TELEPHONE (OUTPATIENT)
Dept: OBGYN | Facility: CLINIC | Age: 36
End: 2021-03-01

## 2021-03-01 NOTE — TELEPHONE ENCOUNTER
----- Message from Vanita Dee RN sent at 2/26/2021  4:04 PM CST -----  Regarding: Trinity Swenson [phone]  Does Dr Wilson have the fax for medical necessity

## 2021-09-18 ENCOUNTER — HEALTH MAINTENANCE LETTER (OUTPATIENT)
Age: 36
End: 2021-09-18

## 2022-01-08 ENCOUNTER — HEALTH MAINTENANCE LETTER (OUTPATIENT)
Age: 37
End: 2022-01-08

## 2022-11-20 ENCOUNTER — HEALTH MAINTENANCE LETTER (OUTPATIENT)
Age: 37
End: 2022-11-20

## 2023-04-15 ENCOUNTER — HEALTH MAINTENANCE LETTER (OUTPATIENT)
Age: 38
End: 2023-04-15

## (undated) DEVICE — LINEN GOWN X4 5410

## (undated) DEVICE — SYR 10ML FINGER CONTROL W/O NDL 309695

## (undated) DEVICE — PAD CHUX UNDERPAD 30X36" P3036C

## (undated) DEVICE — SU MONOCRYL 4-0 PS-2 18" UND Y496G

## (undated) DEVICE — SU VICRYL 0 CT-1 3X27" J430T

## (undated) DEVICE — PREP POVIDONE IODINE SCRUB 7.5% 4OZ APL82212

## (undated) DEVICE — SU VICRYL 0 CT-1 CR 8X27" UND JJ41G

## (undated) DEVICE — DRSG TELFA ISLAND 4X8" 7541

## (undated) DEVICE — DRAPE UNDER BUTTOCK 8483

## (undated) DEVICE — TUBING SMOKE EVAC 1CMX3M SEA3710

## (undated) DEVICE — TUBING SUCTION MEDI-VAC SOFT 3/16"X20' N520A

## (undated) DEVICE — SOL NACL 0.9% IRRIG 1000ML BOTTLE 2F7124

## (undated) DEVICE — PREP SKIN SCRUB TRAY 4461A

## (undated) DEVICE — WIPES FOLEY CARE SURESTEP PROVON DFC100

## (undated) DEVICE — SUCTION VACUUM CANISTER STANDARD W/LID&CAPS 003987-901

## (undated) DEVICE — SUCTION TIP YANKAUER W/O VENT K86

## (undated) DEVICE — SYR BULB IRRIG 50ML LATEX FREE 0035280

## (undated) DEVICE — LIGHT HANDLE X2

## (undated) DEVICE — DECANTER TRANSFER DEVICE 2008S

## (undated) DEVICE — SPECIMEN TRAP VACUUM SUCTION SAFETOUCH 003853-902

## (undated) DEVICE — PAD PERI INDIV WRAP 11" 2022A

## (undated) DEVICE — DRAPE MAYO STAND 23X54 8337

## (undated) DEVICE — GLOVE PROTEXIS W/NEU-THERA 7.0  2D73TE70

## (undated) DEVICE — SPONGE RAY-TEC 4X8" 7318

## (undated) DEVICE — GLOVE PROTEXIS BLUE W/NEU-THERA 7.5  2D73EB75

## (undated) DEVICE — SUCTION CANNULA UTERINE 08MM CVD  20317

## (undated) DEVICE — TUBING SUCTION VACUUM COLLECTION 6FT 610

## (undated) DEVICE — PREP POVIDONE IODINE SOLUTION 10% 4OZ BOTTLE 29906-004

## (undated) DEVICE — Device

## (undated) DEVICE — PANTIES MESH LG/XLG 2PK 706M2

## (undated) DEVICE — PREP CHLORAPREP 26ML TINTED ORANGE  260815

## (undated) DEVICE — SU VICRYL 2-0 SH 27" J317H

## (undated) DEVICE — ESU GROUND PAD UNIVERSAL W/O CORD

## (undated) DEVICE — SUCTION MANIFOLD DORNOCH ULTRA CART UL-CL500

## (undated) DEVICE — SU VICRYL 0 CT-1 36" J946H

## (undated) DEVICE — SUCTION CANNULA UTERINE 12MM CVD  21555

## (undated) DEVICE — SUCTION CANNULA UTERINE 11MM CVD 21554

## (undated) DEVICE — LINEN TOWEL PACK X5 5464

## (undated) DEVICE — LINEN ORTHO PACK 5446

## (undated) DEVICE — SOL WATER IRRIG 1000ML BOTTLE 2F7114

## (undated) DEVICE — DRAPE LAVH/LAPAROSCOPY W/POUCH 29474

## (undated) DEVICE — SUCTION TIP POOLE K770

## (undated) DEVICE — SU VICRYL 0 TIE 54" J608H

## (undated) RX ORDER — LIDOCAINE HYDROCHLORIDE 20 MG/ML
INJECTION, SOLUTION EPIDURAL; INFILTRATION; INTRACAUDAL; PERINEURAL
Status: DISPENSED
Start: 2020-03-04

## (undated) RX ORDER — HYDROMORPHONE HYDROCHLORIDE 1 MG/ML
INJECTION, SOLUTION INTRAMUSCULAR; INTRAVENOUS; SUBCUTANEOUS
Status: DISPENSED
Start: 2020-03-04

## (undated) RX ORDER — PHENYLEPHRINE HCL IN 0.9% NACL 1 MG/10 ML
SYRINGE (ML) INTRAVENOUS
Status: DISPENSED
Start: 2020-03-04

## (undated) RX ORDER — CEFAZOLIN SODIUM 2 G/100ML
INJECTION, SOLUTION INTRAVENOUS
Status: DISPENSED
Start: 2020-03-04

## (undated) RX ORDER — ONDANSETRON 2 MG/ML
INJECTION INTRAMUSCULAR; INTRAVENOUS
Status: DISPENSED
Start: 2020-03-04

## (undated) RX ORDER — DEXAMETHASONE SODIUM PHOSPHATE 4 MG/ML
INJECTION, SOLUTION INTRA-ARTICULAR; INTRALESIONAL; INTRAMUSCULAR; INTRAVENOUS; SOFT TISSUE
Status: DISPENSED
Start: 2020-03-04

## (undated) RX ORDER — LIDOCAINE HYDROCHLORIDE 10 MG/ML
INJECTION, SOLUTION EPIDURAL; INFILTRATION; INTRACAUDAL; PERINEURAL
Status: DISPENSED
Start: 2020-03-04

## (undated) RX ORDER — PROPOFOL 10 MG/ML
INJECTION, EMULSION INTRAVENOUS
Status: DISPENSED
Start: 2020-03-04

## (undated) RX ORDER — FENTANYL CITRATE 50 UG/ML
INJECTION, SOLUTION INTRAMUSCULAR; INTRAVENOUS
Status: DISPENSED
Start: 2020-03-04